# Patient Record
Sex: FEMALE | Race: WHITE | NOT HISPANIC OR LATINO | Employment: FULL TIME | URBAN - NONMETROPOLITAN AREA
[De-identification: names, ages, dates, MRNs, and addresses within clinical notes are randomized per-mention and may not be internally consistent; named-entity substitution may affect disease eponyms.]

---

## 2017-11-03 ENCOUNTER — TRANSCRIBE ORDERS (OUTPATIENT)
Dept: ADMINISTRATIVE | Facility: HOSPITAL | Age: 67
End: 2017-11-03

## 2017-11-03 DIAGNOSIS — R13.10 PROBLEMS WITH SWALLOWING AND MASTICATION: Primary | ICD-10-CM

## 2017-11-06 ENCOUNTER — HOSPITAL ENCOUNTER (OUTPATIENT)
Dept: GENERAL RADIOLOGY | Facility: HOSPITAL | Age: 67
Discharge: HOME | End: 2017-11-06

## 2017-11-06 ENCOUNTER — HOSPITAL ENCOUNTER (OUTPATIENT)
Dept: ULTRASOUND IMAGING | Facility: HOSPITAL | Age: 67
Discharge: HOME | End: 2017-11-06
Admitting: PHYSICIAN ASSISTANT

## 2017-11-06 DIAGNOSIS — R13.10 PROBLEMS WITH SWALLOWING AND MASTICATION: ICD-10-CM

## 2017-11-06 PROCEDURE — G8998 SWALLOW D/C STATUS: HCPCS

## 2017-11-06 PROCEDURE — 76536 US EXAM OF HEAD AND NECK: CPT

## 2017-11-06 PROCEDURE — G8996 SWALLOW CURRENT STATUS: HCPCS

## 2017-11-06 PROCEDURE — 74230 X-RAY XM SWLNG FUNCJ C+: CPT

## 2017-11-06 PROCEDURE — 92611 MOTION FLUOROSCOPY/SWALLOW: CPT

## 2017-11-06 PROCEDURE — G8997 SWALLOW GOAL STATUS: HCPCS

## 2017-11-06 NOTE — THERAPY DISCHARGE NOTE
Outpatient Speech Language Pathology   Adult Swallow Initial Eval/Discharge  [facility]     Patient Name: Anjelica Gee  : 1950  MRN: [id number]  Today's Date: 2017         Visit Date: 2017   There is no problem list on file for this patient.  SPEECH-LANGUAGE PATHOLOGY EVALUTION - VFSS  Subjective: The patient was seen on this date for a VFSS(Videofluoroscopic Swallowing Study).  Patient was alert and cooperative.    The patient's history is significant for pneumonia.   Objective: Risks/benefits were reviewed with the family, and consent was obtained. The study was completed with SLP and Radiologist present. The patient was seen in lateral view(s). Textures given included thin liquid, nectar thick liquid, honey thick liquid, puree consistency, mechanical soft consistency and regular consistency.  Assessment: Difficulties were noted with thin liquid, nectar thick liquid, honey thick liquid, puree consistency, mechanical soft consistency and regular consistency, characterized by premature spillage to the point of the vallecula with regular, mechanical soft, pudding, and honey thick consistencies as a result of decreased back of tongue strength and delayed swallow initiation. ST notes that throughout evaluation multiple swallows were completed with all PO trials. With nectar thick and thin consistencies the pt experienced premature spillage to the point of the pyriform sinuses secondary to decreased back of tongue strength and delayed swallow initiation. The pt was noted to exhibit limited hyolaryngeal excursion and essentially no epiglottic inversion throughout evaluation. With nectar thick liquids the pt experienced flash penetration 1x and 1x deep penetration with questionable aspiration of nectar thick liquids versus residue from previously completed thin liquids. Deep penetration of thin liquids occurred 3x in which the pt was unable to clear residue. A chin tuck was utilized with thin liquids  1x without benefit and the pt actually aspirated thin liquid residue with subsequent swallow. Residue of thin liquids remained within the vestibule and was noted to be aspirated with trials of solid consistencies. Pt's swallow deficits are ultimately the result of decreased back of tongue strength, delayed swallow initiation, decreased hyolaryngeal excursion, and essentially no epiglottic inversion. Moderate residue remained within the vallecula and along the posterior pharyngeal wall with nectar thick and thin liquids. Mild residue was noted throughout the oropharyngeal area with all other consistencies completed. It is important to note the pt did display a delayed cough following instances of aspiration.  Esophageal screen was performed.       Silent Thin Nectar Honey Puree Fork Mashed Mech Soft Regular Liquid Wash   Penetration  x X-deep 3x 1x-flash  1x-deep         Aspiration     x X-?         Residue x x x x x  x x      SLP Findings: Patient presents with moderate oropharyngeal dysphagia.   Recommendations: Diet Textures: honey thick liquid, regular consistency food. Medications should be taken whole with thickened liquids or puree. May have water and Ice between meals after oral care, under staff or family supervision and with the recommended strategies for safe swallowing.  Recommended Strategies: Upright for PO. Oral care before breakfast, after all meals and PRN.   Dysphagia therapy is recommended. Rationale: To improve swallow function.  Conrad Robledo, MS CCC-SLP 11/6/2017 12:08 PM           Past Medical History:   Diagnosis Date   • Depression    • High cholesterol    • Hypertension    • Hypothyroidism         Past Surgical History:   Procedure Laterality Date   • BLADDER SURGERY      sling   • HYSTERECTOMY     • THYROIDECTOMY, PARTIAL     • TONSILLECTOMY           Visit Dx:     ICD-10-CM ICD-9-CM   1. Problems with swallowing and mastication R13.10 V41.6                   Adult Dysphagia - 11/06/17 1000      Adult Dysphagia Background    Patient Description of Complaint Coughing with liquids. Feeling as if food is hanging in throat.  -CS    Date of Onset With the past year  -CS    Symptoms Reported by Patient Coughing;Difficulty swallowing solids;Difficulty swallowing liquids;Difficulty swallowing pills;Food gets stuck  -CS    History Pertinent to Diagnosis History of pneumonia in the past.  -CS    Current Diet (Solids) Regular  -CS    Diet Level (Liquids) Thin Liquid  -CS    Oral Mech    Respiratory/Swallow Coordination Pattern WFL  -CS    Position During Evaluation Upright  -CS    Anatomy/Physiology WNL Patient demonstrates anatomy and physiology that is WNL  -CS    Dentition Patient has dentures  -CS    Oral Health Oral cavity is clean  -CS    Awareness/Control of Secretions Patient swallows saliva  -CS    Foods/Liquids Presented    Method of Administration --  -CS    VFSS Exam    Study Completed By SLP and Radiologist (comment)  -CS    Textures Presented Thin;Nectar;Honey;Pudding;Solid  -CS    Position During Study/Views Obtained Upright;Lateral View  -CS    Physiologic Impairments Noted on VFSS    Physiologic Impairments Noted on VFSS X  -CS    Mistiming x  -CS    Reduced Laryngeal Elevation x  -CS    Reduced Hyolaryngeal Anterior Excursion x  -CS    Reduced Pharyngeal Contraction x  -CS    Reduced Closure Level of Vocal Folds x  -CS    Symptoms    Aspiration X  -CS    Aspiration During With these consistencies (comment);With this reaction (comment)   With thin liquids with a delayed cough  -CS    Residue Noted X  -CS    Valleculae With these consistencies (comment);With this reaction (comment)   With all consistencies  -CS    Pharyngeal Walls With these consistencies (comment);With this reaction (comment)   With thin and nectar thick liquids  -CS    Compensatory Strategies X  -CS    Compensatory Strategies Used Chin tuck with thin liquids.  -CS    Results/Recs/Barriers/Education for Dysphagia    Factors  Affecting Performance no difficulty participating in study  -CS    Clinical Impression: Swallowing Moderate:;oropharyngeal phase dysphagia  -CS    Impact on Function risk for aspiration;risk for pneumonia  -CS    Recommendations for Diet/Nutirition full oral diet  -CS    Swallowing Precautions upright position for at least 30 minutes after meals  -CS    Recommendations dysphagia therapy to address swallowing deficits  -CS      User Key  (r) = Recorded By, (t) = Taken By, (c) = Cosigned By    Initials Name Provider Type    SANDY Robledo MS CCC-SLP Speech and Language Pathologist                            OP SLP Education       11/06/17 1039    Education    Barriers to Learning No barriers identified  -CS    Education Provided Described results of evaluation;Family/caregivers expressed understanding of evaluation  -CS    Assessed Learning needs;Learning motivation;Learning preferences;Learning readiness  -CS    Learning Motivation Strong  -CS    Learning Method Explanation  -CS    Teaching Response Verbalized understanding  -CS    Education Comments Educated of VFSS as well as recommended regular diet with honey thick liquids. Meds to be taken whoe in pudding/applesauce. Ok for water between meals 30 minutes following PO intake.  -CS      User Key  (r) = Recorded By, (t) = Taken By, (c) = Cosigned By    Initials Name Effective Dates    SANDY Robledo MS CCC-SLP 06/28/17 -                     OP SLP Assessment/Plan - 11/06/17 1000     SLP Assessment    Clinical Impression: Swallowing Moderate:;oropharyngeal phase dysphagia  -CS      User Key  (r) = Recorded By, (t) = Taken By, (c) = Cosigned By    Initials Name Provider Type    SANDY Robledo MS CCC-SLP Speech and Language Pathologist              SLP Outcome Measures (last 72 hours)      SLP Outcome Measures       11/06/17 1000          SLP Outcome Measures    Outcome Measure Used? Adult NOMS  -CS      OTHER    SLP Diagnoses Dysphagia  -CS      FCM Scores     FCM Chosen Swallowing  -CS      Swallowing FCM Score 4  -CS        User Key  (r) = Recorded By, (t) = Taken By, (c) = Cosigned By    Initials Name Effective Dates    CS Conrad Robledo MS CCC-SLP 06/28/17 -             Time Calculation:   SLP Start Time: 0945  SLP Stop Time: 1130  SLP Time Calculation (min): 105 min    Therapy Charges for Today     Code Description Service Date Service Provider Modifiers Qty    26186400754 HC ST SWALLOWING CURRENT STATUS 11/6/2017 Conrad Robledo MS CCC-SLP GN, CK 1    64188771124 HC ST SWALLOWING PROJECTED 11/6/2017 Conrad Robledo MS CCC-SLP GN, CK 1    64903805910 HC ST SWALLOWING DISCHARGE 11/6/2017 Conrad Robledo MS CCC-SLP GN, CK 1    [id number] HC ST MOTION FLUORO EVAL SWALLOW 7 11/6/2017 Conrad Robledo MS CCC-SLP GN 1          SLP G-Codes  SLP NOMS Used?: Yes  Functional Limitations: Swallowing  Swallow Current Status (): At least 40 percent but less than 60 percent impaired, limited or restricted  Swallow Goal Status (): At least 40 percent but less than 60 percent impaired, limited or restricted  Swallow Discharge Status (): At least 40 percent but less than 60 percent impaired, limited or restricted           Conrad Robledo MS CCC-SLP  11/6/2017

## 2017-12-04 ENCOUNTER — OFFICE VISIT (OUTPATIENT)
Dept: OTOLARYNGOLOGY | Facility: CLINIC | Age: 67
End: 2017-12-04

## 2017-12-04 VITALS
TEMPERATURE: 97.1 F | DIASTOLIC BLOOD PRESSURE: 65 MMHG | RESPIRATION RATE: 16 BRPM | WEIGHT: 215 LBS | BODY MASS INDEX: 30.78 KG/M2 | SYSTOLIC BLOOD PRESSURE: 134 MMHG | HEIGHT: 70 IN | HEART RATE: 56 BPM

## 2017-12-04 DIAGNOSIS — R13.14 PHARYNGOESOPHAGEAL DYSPHAGIA: Primary | ICD-10-CM

## 2017-12-04 DIAGNOSIS — J31.0 OTHER CHRONIC RHINITIS: ICD-10-CM

## 2017-12-04 DIAGNOSIS — K21.9 LPRD (LARYNGOPHARYNGEAL REFLUX DISEASE): ICD-10-CM

## 2017-12-04 DIAGNOSIS — E04.9 GOITER: ICD-10-CM

## 2017-12-04 DIAGNOSIS — J37.0 CHRONIC LARYNGITIS: ICD-10-CM

## 2017-12-04 DIAGNOSIS — E89.0 POSTOPERATIVE HYPOTHYROIDISM: ICD-10-CM

## 2017-12-04 PROCEDURE — 99204 OFFICE O/P NEW MOD 45 MIN: CPT | Performed by: PHYSICIAN ASSISTANT

## 2017-12-04 PROCEDURE — 31575 DIAGNOSTIC LARYNGOSCOPY: CPT | Performed by: PHYSICIAN ASSISTANT

## 2017-12-04 RX ORDER — ACETAMINOPHEN 325 MG/1
650 TABLET ORAL
COMMUNITY

## 2017-12-04 RX ORDER — MULTIVIT WITH MINERALS/LUTEIN
250 TABLET ORAL
COMMUNITY
End: 2022-10-11

## 2017-12-04 RX ORDER — CETIRIZINE HYDROCHLORIDE 10 MG/1
10 TABLET ORAL
COMMUNITY
End: 2022-10-11

## 2017-12-04 RX ORDER — FLUOXETINE HYDROCHLORIDE 20 MG/1
20 CAPSULE ORAL
COMMUNITY
End: 2019-06-11 | Stop reason: HOSPADM

## 2017-12-04 RX ORDER — LEVOTHYROXINE SODIUM 88 UG/1
TABLET ORAL
COMMUNITY

## 2017-12-04 RX ORDER — MULTIVIT-MIN/IRON/FOLIC ACID/K 18-600-40
CAPSULE ORAL
COMMUNITY
End: 2022-10-11

## 2017-12-04 NOTE — PATIENT INSTRUCTIONS
Recommendation was made to consider a Paleo Diet.      It Starts with Food - Carlos and Adrianna Mortensen was recommended.  The basics of a Paleo diet was covered including a diet composed of meat, fruits and non-leguminous vegetables.  It is important to avoid all processed foods.  This requires that you not eat ANYTHING with a chemical preservative.    The Paleo lifestyle is about nourishing our bodies with real food that is grown and raised as nature intended, not manufactured in a facility.  It is about unplugging from the modern day electronics from time to time and giving your body a chance to actually rest.    It is important to stick very close to this diet for 6 weeks without significant cheating.  It allows you to experience the benefit of eating this way, giving your body time to detoxify.    Acceptable foods  Fresh Fish/seafood  Fresh meats-preferably grass-fed and free range  Fresh fruits  Fresh vegetables  Healthy fats-Coconut oil, olive oil, avocados etc.  Nuts/seeds    Foods to avoid  Grains  Legumes  Processed foods  Soy  Refined sugar    Web sites include:  www.[employer].PrimalApicad.Cytodyn  www.EverydayPaleo.com  www.Aviir  Www.Victor.Cytodyn    Other Karen Resources:  NomNojorgito Paleo  PrimalPaleo  Paleo Alligator  Nourished      Other Recommended Books:  The Paleo Solution  Wheat Belly  Grain Brain  Primal Body/Primal Mind    ALL ABOUT HEARTBURN AND THE LIFESTYLE CHANGES THAT HELP    Lifestyle Changes Can Help Relieve The Burning Pain In Your Tummy    What is Heartburn?  Heartburn occurs when the lining of the esophagus (the tube that connects the mouth to the stomach) is exposed to and irritated by stomach acid.  Heartburn often feels like a burning pain in the middle of your chest that moves up your throat.  You may also have the sensation that food has come back into your mouth, leaving a sour or bitter taste.  Almost everyone has heartburn once in a while.  But if you have heartburn 2 or  more times per week, it can be a sign of a more serious problem call gastroesophogeal reflux disease, or GERD.    What causes Heartburn?  Heartburn usually occurs when the valve at the segundo of the stomach (the lower esophageal sphincter or LES) doesn’t close the way it should.  If the LES is weak or opens at the wrong time, stomach acid can reflux (flow back) into the esophagus and cause heartburn.  Factors that can affect the LES include:    • Eating or drinking certain foods and beverages such as chocolate, peppermint, fried or fatty foods, coffee, or drinks that contain alcohol  • Having a hiatal hernia - a common physical condition where part of the stomach protrudes up through the diaphragm  • Lying down  • Smoking cigarettes  • Taking certain medications (be sure to tell your doctor about all medications or supplements you take)    What foods can make heartburn worse?  All foods cause the stomach to produce acid, although foods can affect people in different ways.  Following is a list of foods and beverages that may aggravate your symptoms.  You may want to eat them less often.    • Fried or fatty foods  • Heavy seasoning and spicy foods  • Coffee  • Onions  • Orange juice, grapefruit juice, and tomato juice  • Alcoholic beverages  • Chocolate  • Peppermint and spearmint    What else can I do to help control my heartburn?  Try these lifestyle changes:  • Stop Smoking  • Lose weight - if you’re overweight  • Exercise to help control your weight (talk to your doctor first before starting any exercise program).  • Eat small, well-balanced meals  • Reduce abdominal pressure-don’t wear tight belts or tight clothing  • Avoid eating within 2 hours before bedtime  • Elevate your bed so the head is 6 to 8 inches higher than the foot.  This can help reduce acid reflux at night  • Let your doctor know about all the drugs and supplements you are taking.  Some of them may contribute to your heartburn.    What if these  things don’t work?  Talk to your doctor, who can discuss many treatments with you.  Although there are several possible causes of heartburn associated with GERD, they all involve stomach acids.  So no matter what the cause may be, the medicines to reduce stomach acid can prevent heartburn.          MyPlate from [employer]  The general, healthful diet is based on the 2010 Dietary Guidelines for Americans. The amount of food you need to eat from each food group depends on your age, sex, and level of physical activity and can be individualized by a dietitian. Go to ChooseMyPlate.gov for more information.  WHAT DO I NEED TO KNOW ABOUT THE MYPLATE PLAN?  · Enjoy your food, but eat less.    · Avoid oversized portions.      ½ of your plate should include fruits and vegetables.    ¼ of your plate should be grains.    ¼ of your plate should be protein.  Grains  · Make at least half of your grains whole grains.  · For a 2,000 calorie daily food plan, eat 6 oz every day.  · 1 oz is about 1 slice bread, 1 cup cereal, or ½ cup cooked rice, cereal, or pasta.  Vegetables  · Make half your plate fruits and vegetables.  · For a 2,000 calorie daily food plan, eat 2½ cups every day.  · 1 cup is about 1 cup raw or cooked vegetables or vegetable juice or 2 cups raw leafy greens.  Fruits  · Make half your plate fruits and vegetables.  · For a 2,000 calorie daily food plan, eat 2 cups every day.  · 1 cup is about 1 cup fruit or 100% fruit juice or ½ cup dried fruit.  Protein  · For a 2,000 calorie daily food plan, eat 5½ oz every day.  · 1 oz is about 1 oz meat, poultry, or fish, ¼ cup cooked beans, 1 egg, 1 Tbsp peanut butter, or ½ oz nuts or seeds.  Dairy  · Switch to fat-free or low-fat (1%) milk.  · For a 2,000 calorie daily food plan, eat 3 cups every day.  · 1 cup is about 1 cup milk or yogurt or soy milk (soy beverage), 1½ oz natural cheese, or 2 oz processed cheese.  Fats, Oils, and Empty Calories  · Only small amounts of oils are  recommended.  · Empty calories are calories from solid fats or added sugars.  · Compare sodium in foods like soup, bread, and frozen meals. Choose the foods with lower numbers.  · Drink water instead of sugary drinks.  WHAT FOODS CAN I EAT?  Grains  Whole grains such as whole wheat, quinoa, millet, and bulgur. Bread, rolls, and pasta made from whole grains. Brown or wild rice. Hot or cold cereals made from whole grains and without added sugar.  Vegetables  All fresh vegetables, especially fresh red, dark green, or orange vegetables. Peas and beans. Low-sodium frozen or canned vegetables prepared without added salt. Low-sodium vegetable juices.  Fruits  All fresh, frozen, and dried fruits. Canned fruit packed in water or fruit juice without added sugar. Fruit juices without added sugar.  Meats and Other Protein Sources  Boiled, baked, or grilled lean meat trimmed of fat. Skinless poultry. Fresh seafood and shellfish. Canned seafood packed in water. Unsalted nuts and unsalted nut butters. Tofu. Dried beans and pea. Eggs.  Dairy  Low-fat or fat-free milk, yogurt, and cheeses.   Sweets and Desserts  Frozen desserts made from low-fat milk.  Fats and Oils  Olive, peanut, and canola oils and margarine. Salad dressing and mayonnaise made from these oils.  Other  Soups and casseroles made from allowed ingredients and without added fat or salt.  The items listed above may not be a complete list of recommended foods or beverages. Contact your dietitian for more options.   WHAT FOODS ARE NOT RECOMMENDED?  Grains  Sweetened, low-fiber cereals. Packaged baked goods. Snack crackers and chips. Cheese crackers, butter crackers, and biscuits. Frozen waffles, sweet breads, doughnuts, pastries, packaged baking mixes, pancakes, cakes, and cookies.  Vegetables  Regular canned or frozen vegetables or vegetables prepared with salt. Canned tomatoes. Canned tomato sauce. Fried vegetables. Vegetables in cream sauce or cheese  sauce.  Fruits  Fruits packed in syrup or made with added sugar.   Meats and Other Protein Sources  Marbled or fatty meats such as ribs. Poultry with skin. Fried meats, poultry, eggs, or fish. Sausages, hot dogs, and deli meats such as pastrami, bologna, or salami.  Dairy  Whole milk, cream, cheeses made from whole milk, sour cream. Ice cream or yogurt made from whole milk or with added sugar.  Beverages  For adults, no more than one alcoholic drink per day. Regular soft drinks or other sugary beverages. Juice drinks.  Sweets and Desserts  Sugary or fatty desserts, candy, and other sweets.  Fats and Oils  Solid shortening or partially hydrogenated oils. Solid margarine. Margarine that contains trans fats. Butter.  The items listed above may not be a complete list of foods and beverages to avoid. Contact your dietitian for more information.     This information is not intended to replace advice given to you by your health care provider. Make sure you discuss any questions you have with your health care provider.     Document Released: 01/06/2009 Document Revised: 01/08/2016 Document Reviewed: 11/26/2014  [employer] Interactive Patient Education ©2017 [employer]

## 2017-12-04 NOTE — PROGRESS NOTES
YOB: 1950  Location: [facility]  Location Address: [address] [place] [address], [facility], [address] [place] [address]  Location Phone: [phone]    Chief Complaint   Patient presents with   • Difficulty Swallowing       History of Present Illness  Anjelica Gee is a 67 y.o. female.  Anjelica Gee is here for evaluation of ENT complaints. The patient has had problems with dysphagia and a globus sensation  The symptoms are localized to the right side. The patient has had mild to moderate symptoms. The symptoms have been present for the last several months The symptoms are aggravated by  no identifiable factors. The symptoms are improved by no identifiable factors.     Study Result   US THYROID- 2017 8:27 AM CST      REASON FOR EXAM: R13.10; R13.10-Dysphagia, unspecified        COMPARISON: NONE.       TECHNIQUE: Multiple longitudinal and transverse real-time sonographic  images of the thyroid are obtained.       FINDINGS:   The LEFT thyroid lobe has been removed. The RIGHT thyroid lobe is  heterogeneous and measures 6.1 x 2.7 x 3.3 cm. Vascularity is increased  area no discrete nodule is identified. There is thickening of the  isthmus to 6 mm.      IMPRESSION:  1. Heterogeneous and enlarged RIGHT thyroid without discrete nodule.  Findings are likely due to thyroiditis.  2. Post LEFT thyroidectomy.      This report was finalized on 2017 09:27 by Dr. Ruddy Nguyễn MD.     Study Result   FL VIDEO SWALLOW W SPEECH- 2017 8:57 AM CST      REASON FOR EXAM: R13.10; R13.10-Dysphagia, unspecified      COMPARISON: NONE       FLUOROSCOPY TIME: 1.2 minutes      NUMBER OF IMAGES: None      TECHNIQUE: In conjunction with speech pathology services, video  fluoroscopic swallow examination was performed with oral ingestion of  barium containing substances of various viscosities.       FINDINGS: Medium bolus sizes are well controlled with no spillage into  the oropharynx. No pooling is demonstrated. There is soft  palate  elevation and coverage of the posterior nasopharynx with swallowing. No  stasis is noted within the valleculae or piriform sinuses. Deep  penetration is noted with nectar and thin consistencies.      IMPRESSION:  1. Normal swallowing mechanism.   2. Deep penetration without evidence of aspiration.       Please see speech pathologist's report for further details and  recommendations.           This report was finalized on 11/06/2017 09:17 by Dr. Ruddy Nguyễn MD       Past Medical History:   Diagnosis Date   • Depression    • High cholesterol    • Hypertension    • Hypothyroidism        Past Surgical History:   Procedure Laterality Date   • BLADDER SURGERY      sling   • HYSTERECTOMY     • THYROIDECTOMY, PARTIAL     • THYROIDECTOMY, PARTIAL Left    • TONSILLECTOMY     • TONSILLECTOMY           Current Outpatient Prescriptions:   •  acetaminophen (TYLENOL) 325 MG tablet, Take 650 mg by mouth., Disp: , Rfl:   •  carvedilol (COREG) 6.25 MG tablet, Take 6.25 mg by mouth 2 times daily (with meals).  , Disp: , Rfl:   •  cetirizine (zyrTEC) 10 MG tablet, Take 10 mg by mouth., Disp: , Rfl:   •  Cholecalciferol (VITAMIN D) 2000 units capsule, Take  by mouth., Disp: , Rfl:   •  FLUoxetine (PROzac) 20 MG capsule, Take 20 mg by mouth., Disp: , Rfl:   •  furosemide (LASIX) 20 MG tablet, , Disp: , Rfl:   •  levothyroxine (SYNTHROID, LEVOTHROID) 88 MCG tablet, Take  by mouth., Disp: , Rfl:   •  losartan (COZAAR) 100 MG tablet, , Disp: , Rfl:   •  lovastatin (MEVACOR) 20 MG tablet, , Disp: , Rfl: 5  •  meloxicam (MOBIC) 15 MG tablet, , Disp: , Rfl: 5  •  mupirocin (BACTROBAN) 2 % ointment, Apply  topically 3 (Three) Times a Day. intranasal, Disp: 22 g, Rfl: 0  •  pantoprazole (PROTONIX) 40 MG EC tablet, , Disp: , Rfl:   •  PARoxetine (PAXIL) 40 MG tablet, Take 40 mg by mouth every morning.  , Disp: , Rfl:   •  vitamin C (ASCORBIC ACID) 250 MG tablet, Take 250 mg by mouth., Disp: , Rfl:     Ace inhibitors; Amlodipine  besy-benazepril hcl; Aspirin; B12 folate  [folic acid-vit b6-vit b12]; Codeine; Demerol [meperidine]; Detrol  [tolterodine tartrate]; Latex; Morphine and related; Penicillins; and Sulfa antibiotics    Family History   Problem Relation Age of Onset   • No Known Problems Father    • No Known Problems Mother    • Kidney cancer Sister    • Prostate cancer Brother        Social History     Social History   • Marital status:      Spouse name: N/A   • Number of children: N/A   • Years of education: N/A     Occupational History   • Not on file.     Social History Main Topics   • Smoking status: Never Smoker   • Smokeless tobacco: Never Used   • Alcohol use No   • Drug use: No   • Sexual activity: Not on file     Other Topics Concern   • Not on file     Social History Narrative       Review of Systems   Constitutional: Negative for activity change, appetite change, chills, diaphoresis, fatigue, fever and unexpected weight change.   HENT: Positive for trouble swallowing and voice change. Negative for congestion, dental problem, drooling, ear discharge, ear pain, facial swelling, hearing loss, mouth sores, nosebleeds, postnasal drip, rhinorrhea, sinus pressure, sneezing, sore throat and tinnitus.         Enlarged thyroid   Eyes: Negative.    Respiratory: Negative.    Cardiovascular: Negative.    Gastrointestinal: Negative.    Endocrine: Negative.    Skin: Negative.    Allergic/Immunologic: Negative for environmental allergies, food allergies and immunocompromised state.   Neurological: Negative.    Hematological: Negative.    Psychiatric/Behavioral: Negative.        Vitals:    12/04/17 0958   BP: 134/65   Pulse: 56   Resp: 16   Temp: 97.1 °F (36.2 °C)       Objective     Physical Exam  CONSTITUTIONAL: well nourished, alert, oriented, in no acute distress     COMMUNICATION AND VOICE: able to communicate normally, normal voice quality    HEAD: normocephalic, no lesions, atraumatic, no tenderness, no masses     FACE:  appearance normal, no lesions, no tenderness, no deformities, facial motion symmetric    SALIVARY GLANDS: parotid glands with no tenderness, no swelling, no masses, submandibular glands with normal size, nontender    EYES: ocular motility normal, eyelids normal, orbits normal, no proptosis, conjunctiva normal , pupils equal, round     EARS:  Hearing: response to conversational voice normal bilaterally   External Ears: auricles without lesions  Otoscopic: tympanic membrane appearance normal, no lesions, no perforation, normal mobility, no fluid    NOSE:  External Nose: structure normal, no tenderness on palpation, no nasal discharge, no lesions, no evidence of trauma, nostrils patent   Intranasal Exam: nasal mucosa edema and erythema with crusting and drainage, vestibule within normal limits, inferior turbinate enlarged, nasal septum midline   Nasopharynx:     ORAL:  Lips: upper and lower lips without lesion   Teeth: dentition within normal limits for age   Gums: gingivae healthy   Oral Mucosa: oral mucosa normal, no mucosal lesions   Floor of Mouth: Warthin’s duct patent, mucosa normal  Tongue: lingual mucosa normal without lesions, normal tongue mobility   Palate: soft and hard palates with normal mucosa and structure  Oropharynx: oropharyngeal mucosa erythema    NECK: neck appearance normal, no mass,  noted without erythema or tenderness    THYROID: right thyromegaly, left surgically absent, no tenderness, nodules or mass present on palpation, position midline     LYMPH NODES: no lymphadenopathy    CHEST/RESPIRATORY: respiratory effort normal, normal breath sounds     CARDIOVASCULAR: rate and rhythm normal, extremities without cyanosis or edema      NEUROLOGIC/PSYCHIATRIC: oriented to time, place and person, mood normal, affect appropriate, CN II-XII intact grossly    Procedure Note    Anesthesia: topical 4% tetracaine and oxymetazoline mix    Endoscopy Type: Flexible Laryngoscopy    Indications for Procedure:  Hoarseness, dysphagia or aspiration - not able to be clearly evaluated by indirect laryngoscopy    Procedure Details:    The patient was placed in the sitting position.  After topical anesthesia and decongestion, the 4 mm laryngoscope was passed.  The nasal cavities, nasopharynx, oropharynx, hypopharynx, and larynx were all examined.  Vocal cords were examined during respiration and phonation.  The following findings were noted:    Findings:   Mucosal Surfaces:  The mucosal surfaces demonstrated inflammation, thickened mucus, and edema.   Nasopharynx:  The nasopharynx was found to have no lesion or mass.   Base of Tongue:  The base of tongue was found to have no mass or lesion.   Epiglottis:  The epiglottis was found to have mild edema.   Aryepiglottic Folds:  The AE folds were found to have no mass or lesion.   False Vocal Cords:  The false vocal cords were found to have no mass or lesion.   True Vocal Cords:  The true vocal cords were found to have no mass or lesion.   Arytenoids:  The arytenoids were found to have findings of intra- arytenoid pachydermic change and erythema.   Hypopharynx:  The hypopharynx was found to have no mass or lesion.     Condition:  Stable.  Patient tolerated procedure well.    Complications:  None      Assessment/Plan   Problems Addressed this Visit        Respiratory    LPRD (laryngopharyngeal reflux disease)    Chronic laryngitis    Chronic rhinitis    Relevant Medications    cetirizine (zyrTEC) 10 MG tablet    mupirocin (BACTROBAN) 2 % ointment       Digestive    Pharyngoesophageal dysphagia - Primary       Endocrine    Postoperative hypothyroidism    Relevant Medications    levothyroxine (SYNTHROID, LEVOTHROID) 88 MCG tablet    Goiter    Relevant Medications    levothyroxine (SYNTHROID, LEVOTHROID) 88 MCG tablet        * Surgery not found *  No orders of the defined types were placed in this encounter.    Return in about 4 months (around 4/4/2018) for Recheck swallowing.        Patient Instructions   Recommendation was made to consider a Paleo Diet.      It Starts with Food - Carlos and Adrianna Mortensen was recommended.  The basics of a Paleo diet was covered including a diet composed of meat, fruits and non-leguminous vegetables.  It is important to avoid all processed foods.  This requires that you not eat ANYTHING with a chemical preservative.    The Paleo lifestyle is about nourishing our bodies with real food that is grown and raised as nature intended, not manufactured in a facility.  It is about unplugging from the modern day electronics from time to time and giving your body a chance to actually rest.    It is important to stick very close to this diet for 6 weeks without significant cheating.  It allows you to experience the benefit of eating this way, giving your body time to detoxify.    Acceptable foods  Fresh Fish/seafood  Fresh meats-preferably grass-fed and free range  Fresh fruits  Fresh vegetables  Healthy fats-Coconut oil, olive oil, avocados etc.  Nuts/seeds    Foods to avoid  Grains  Legumes  Processed foods  Soy  Refined sugar    Web sites include:  www.PrimalBodyREQQIPrimalMind.BOLD Guidance  www.EverydayPaleo.com  www.[employer]  Www.[employer]    Other Karen Resources:  NomNojorgito Paleo  PrimalPaleo  Paleo Lenorah  Nourished      Other Recommended Books:  The Paleo Solution  Wheat Belly  Grain Brain  Primal Body/Primal Mind    ALL ABOUT HEARTBURN AND THE LIFESTYLE CHANGES THAT HELP    Lifestyle Changes Can Help Relieve The Burning Pain In Your Tummy    What is Heartburn?  Heartburn occurs when the lining of the esophagus (the tube that connects the mouth to the stomach) is exposed to and irritated by stomach acid.  Heartburn often feels like a burning pain in the middle of your chest that moves up your throat.  You may also have the sensation that food has come back into your mouth, leaving a sour or bitter taste.  Almost everyone has heartburn once in a while.  But  if you have heartburn 2 or more times per week, it can be a sign of a more serious problem call gastroesophogeal reflux disease, or GERD.    What causes Heartburn?  Heartburn usually occurs when the valve at the segundo of the stomach (the lower esophageal sphincter or LES) doesn’t close the way it should.  If the LES is weak or opens at the wrong time, stomach acid can reflux (flow back) into the esophagus and cause heartburn.  Factors that can affect the LES include:    • Eating or drinking certain foods and beverages such as chocolate, peppermint, fried or fatty foods, coffee, or drinks that contain alcohol  • Having a hiatal hernia - a common physical condition where part of the stomach protrudes up through the diaphragm  • Lying down  • Smoking cigarettes  • Taking certain medications (be sure to tell your doctor about all medications or supplements you take)    What foods can make heartburn worse?  All foods cause the stomach to produce acid, although foods can affect people in different ways.  Following is a list of foods and beverages that may aggravate your symptoms.  You may want to eat them less often.    • Fried or fatty foods  • Heavy seasoning and spicy foods  • Coffee  • Onions  • Orange juice, grapefruit juice, and tomato juice  • Alcoholic beverages  • Chocolate  • Peppermint and spearmint    What else can I do to help control my heartburn?  Try these lifestyle changes:  • Stop Smoking  • Lose weight - if you’re overweight  • Exercise to help control your weight (talk to your doctor first before starting any exercise program).  • Eat small, well-balanced meals  • Reduce abdominal pressure-don’t wear tight belts or tight clothing  • Avoid eating within 2 hours before bedtime  • Elevate your bed so the head is 6 to 8 inches higher than the foot.  This can help reduce acid reflux at night  • Let your doctor know about all the drugs and supplements you are taking.  Some of them may contribute to your  heartburn.    What if these things don’t work?  Talk to your doctor, who can discuss many treatments with you.  Although there are several possible causes of heartburn associated with GERD, they all involve stomach acids.  So no matter what the cause may be, the medicines to reduce stomach acid can prevent heartburn.          MyPlate from [employer]  The general, healthful diet is based on the 2010 Dietary Guidelines for Americans. The amount of food you need to eat from each food group depends on your age, sex, and level of physical activity and can be individualized by a dietitian. Go to ChooseMyPlate.gov for more information.  WHAT DO I NEED TO KNOW ABOUT THE MYPLATE PLAN?  · Enjoy your food, but eat less.    · Avoid oversized portions.      ½ of your plate should include fruits and vegetables.    ¼ of your plate should be grains.    ¼ of your plate should be protein.  Grains  · Make at least half of your grains whole grains.  · For a 2,000 calorie daily food plan, eat 6 oz every day.  · 1 oz is about 1 slice bread, 1 cup cereal, or ½ cup cooked rice, cereal, or pasta.  Vegetables  · Make half your plate fruits and vegetables.  · For a 2,000 calorie daily food plan, eat 2½ cups every day.  · 1 cup is about 1 cup raw or cooked vegetables or vegetable juice or 2 cups raw leafy greens.  Fruits  · Make half your plate fruits and vegetables.  · For a 2,000 calorie daily food plan, eat 2 cups every day.  · 1 cup is about 1 cup fruit or 100% fruit juice or ½ cup dried fruit.  Protein  · For a 2,000 calorie daily food plan, eat 5½ oz every day.  · 1 oz is about 1 oz meat, poultry, or fish, ¼ cup cooked beans, 1 egg, 1 Tbsp peanut butter, or ½ oz nuts or seeds.  Dairy  · Switch to fat-free or low-fat (1%) milk.  · For a 2,000 calorie daily food plan, eat 3 cups every day.  · 1 cup is about 1 cup milk or yogurt or soy milk (soy beverage), 1½ oz natural cheese, or 2 oz processed cheese.  Fats, Oils, and Empty Calories  · Only  small amounts of oils are recommended.  · Empty calories are calories from solid fats or added sugars.  · Compare sodium in foods like soup, bread, and frozen meals. Choose the foods with lower numbers.  · Drink water instead of sugary drinks.  WHAT FOODS CAN I EAT?  Grains  Whole grains such as whole wheat, quinoa, millet, and bulgur. Bread, rolls, and pasta made from whole grains. Brown or wild rice. Hot or cold cereals made from whole grains and without added sugar.  Vegetables  All fresh vegetables, especially fresh red, dark green, or orange vegetables. Peas and beans. Low-sodium frozen or canned vegetables prepared without added salt. Low-sodium vegetable juices.  Fruits  All fresh, frozen, and dried fruits. Canned fruit packed in water or fruit juice without added sugar. Fruit juices without added sugar.  Meats and Other Protein Sources  Boiled, baked, or grilled lean meat trimmed of fat. Skinless poultry. Fresh seafood and shellfish. Canned seafood packed in water. Unsalted nuts and unsalted nut butters. Tofu. Dried beans and pea. Eggs.  Dairy  Low-fat or fat-free milk, yogurt, and cheeses.   Sweets and Desserts  Frozen desserts made from low-fat milk.  Fats and Oils  Olive, peanut, and canola oils and margarine. Salad dressing and mayonnaise made from these oils.  Other  Soups and casseroles made from allowed ingredients and without added fat or salt.  The items listed above may not be a complete list of recommended foods or beverages. Contact your dietitian for more options.   WHAT FOODS ARE NOT RECOMMENDED?  Grains  Sweetened, low-fiber cereals. Packaged baked goods. Snack crackers and chips. Cheese crackers, butter crackers, and biscuits. Frozen waffles, sweet breads, doughnuts, pastries, packaged baking mixes, pancakes, cakes, and cookies.  Vegetables  Regular canned or frozen vegetables or vegetables prepared with salt. Canned tomatoes. Canned tomato sauce. Fried vegetables. Vegetables in cream sauce  or cheese sauce.  Fruits  Fruits packed in syrup or made with added sugar.   Meats and Other Protein Sources  Marbled or fatty meats such as ribs. Poultry with skin. Fried meats, poultry, eggs, or fish. Sausages, hot dogs, and deli meats such as pastrami, bologna, or salami.  Dairy  Whole milk, cream, cheeses made from whole milk, sour cream. Ice cream or yogurt made from whole milk or with added sugar.  Beverages  For adults, no more than one alcoholic drink per day. Regular soft drinks or other sugary beverages. Juice drinks.  Sweets and Desserts  Sugary or fatty desserts, candy, and other sweets.  Fats and Oils  Solid shortening or partially hydrogenated oils. Solid margarine. Margarine that contains trans fats. Butter.  The items listed above may not be a complete list of foods and beverages to avoid. Contact your dietitian for more information.     This information is not intended to replace advice given to you by your health care provider. Make sure you discuss any questions you have with your health care provider.     Document Released: 01/06/2009 Document Revised: 01/08/2016 Document Reviewed: 11/26/2014  [employer] Interactive Patient Education ©2017 [employer]

## 2018-09-18 NOTE — PROGRESS NOTES
Ms. Gee is 68 y.o. female    CHIEF COMPLAINT: I am here for follow up on atrophic vaginitis.    HPI  This is a patient with atrophic vaginitis the vaginal epithelium.  This is been an issue for her for at least 10-15 years.  This was complicated by a protrusion of a TVT sling.  She underwent flap mobilization and coverage followed with Premarin therapy.  She is done well without vaginal discharge.  She has no incontinence.  She has experienced some pelvic discomfort.      The following portions of the patient's history were reviewed and updated as appropriate: allergies, current medications, past family history, past medical history, past social history, past surgical history and problem list.      Review of Systems   Constitutional: Negative for chills and fever.   Gastrointestinal: Negative for abdominal pain, anal bleeding and blood in stool.   Genitourinary: Positive for pelvic pain. Negative for dysuria and hematuria.   All other systems reviewed and are negative.          Current Outpatient Prescriptions:   •  acetaminophen (TYLENOL) 325 MG tablet, Take 650 mg by mouth., Disp: , Rfl:   •  carvedilol (COREG) 6.25 MG tablet, Take 6.25 mg by mouth 2 times daily (with meals).  , Disp: , Rfl:   •  cetirizine (zyrTEC) 10 MG tablet, Take 10 mg by mouth., Disp: , Rfl:   •  Cholecalciferol (VITAMIN D) 2000 units capsule, Take  by mouth., Disp: , Rfl:   •  FLUoxetine (PROzac) 20 MG capsule, Take 20 mg by mouth., Disp: , Rfl:   •  furosemide (LASIX) 20 MG tablet, , Disp: , Rfl:   •  levothyroxine (SYNTHROID, LEVOTHROID) 88 MCG tablet, Take  by mouth., Disp: , Rfl:   •  losartan (COZAAR) 100 MG tablet, , Disp: , Rfl:   •  lovastatin (MEVACOR) 20 MG tablet, , Disp: , Rfl: 5  •  meloxicam (MOBIC) 15 MG tablet, , Disp: , Rfl: 5  •  mupirocin (BACTROBAN) 2 % ointment, Apply  topically 3 (Three) Times a Day. intranasal, Disp: 22 g, Rfl: 0  •  pantoprazole (PROTONIX) 40 MG EC tablet, , Disp: , Rfl:   •  PARoxetine (PAXIL)  "40 MG tablet, Take 40 mg by mouth every morning.  , Disp: , Rfl:   •  vitamin C (ASCORBIC ACID) 250 MG tablet, Take 250 mg by mouth., Disp: , Rfl:   •  [START ON 9/27/2018] conjugated estrogens (PREMARIN) 0.625 MG/GM vaginal cream, Insert  into the vagina 2 (Two) Times a Week for 30 days. Use 0.5gms intravaginally as directed twice weekly, Disp: 42.5 g, Rfl: 5    Past Medical History:   Diagnosis Date   • Chronic laryngitis 12/4/2017   • Chronic rhinitis 12/4/2017   • Depression    • Goiter 12/4/2017   • High cholesterol    • Hypertension    • Hypothyroidism    • LPRD (laryngopharyngeal reflux disease) 12/4/2017   • Postoperative hypothyroidism 12/4/2017       Past Surgical History:   Procedure Laterality Date   • BLADDER SURGERY      sling   • HYSTERECTOMY     • THYROIDECTOMY, PARTIAL     • THYROIDECTOMY, PARTIAL Left    • TONSILLECTOMY     • TONSILLECTOMY         Social History     Social History   • Marital status:      Social History Main Topics   • Smoking status: Never Smoker   • Smokeless tobacco: Never Used   • Alcohol use No   • Drug use: No     Other Topics Concern   • Not on file       Family History   Problem Relation Age of Onset   • No Known Problems Father    • No Known Problems Mother    • Kidney cancer Sister    • Prostate cancer Brother          /76   Temp 98.9 °F (37.2 °C)   Ht 172.7 cm (68\")   Wt 98 kg (216 lb)   BMI 32.84 kg/m²       Physical Exam  Constitutional: Well nourished, Well developed; No apparent distress, her vital signs are reviewed  Psychiatric: Appropriate affect; Alert and oriented  Eyes: Unremarkable  Musculoskeletal: Normal gait and station  GI: Abdomen is soft, non-tender  Respiratory: No distress; Unlabored movement; No accessory musculature needed with symmetric movements  Skin: No pallor or diaphoresis  : Labia normal; Urethral meatus normal position, not stenotic; No significant bladder prolapse.  Pale, smooth, shiny vaginal epithelium with significant " decrease in rugae. Diminished elasticity and turgor of skin, Sparse pubic hair and Retracted urethra without obvious stenosis noted.  A cystocele is not apparent.         Data  Results for orders placed or performed in visit on 09/26/18   POC Urinalysis Dipstick, Multipro   Result Value Ref Range    Color Yellow Yellow, Straw, Dark Yellow, Kelsi    Clarity, UA Clear Clear    Glucose, UA Negative Negative, 1000 mg/dL (3+) mg/dL    Bilirubin Negative Negative    Ketones, UA Negative Negative    Specific Gravity  1.025 1.005 - 1.030    Blood, UA Negative Negative    pH, Urine 6.0 5.0 - 8.0    Protein, POC Negative Negative mg/dL    Urobilinogen, UA Normal Normal    Nitrite, UA Negative Negative    Leukocytes Negative Negative       Assessment and Plan  Anjelica was seen today for follow-up.    Diagnoses and all orders for this visit:    Atrophic vaginitis  -     POC Urinalysis Dipstick, Multipro  -     conjugated estrogens (PREMARIN) 0.625 MG/GM vaginal cream; Insert  into the vagina 2 (Two) Times a Week for 30 days. Use 0.5gms intravaginally as directed twice weekly      -Continue vaginal estrogen replacement.     (Please note that portions of this note were completed with a voice recognition program.)  Kit Sarkar MD  09/26/18  11:57 AM      Cc: Reynaldo Garcia MD

## 2018-09-26 ENCOUNTER — OFFICE VISIT (OUTPATIENT)
Dept: UROLOGY | Facility: CLINIC | Age: 68
End: 2018-09-26

## 2018-09-26 VITALS
HEIGHT: 68 IN | BODY MASS INDEX: 32.74 KG/M2 | TEMPERATURE: 98.9 F | DIASTOLIC BLOOD PRESSURE: 76 MMHG | WEIGHT: 216 LBS | SYSTOLIC BLOOD PRESSURE: 132 MMHG

## 2018-09-26 DIAGNOSIS — N95.2 ATROPHIC VAGINITIS: Primary | ICD-10-CM

## 2018-09-26 LAB
BILIRUB BLD-MCNC: NEGATIVE MG/DL
CLARITY, POC: CLEAR
COLOR UR: YELLOW
GLUCOSE UR STRIP-MCNC: NEGATIVE MG/DL
KETONES UR QL: NEGATIVE
LEUKOCYTE EST, POC: NEGATIVE
NITRITE UR-MCNC: NEGATIVE MG/ML
PH UR: 6 [PH] (ref 5–8)
PROT UR STRIP-MCNC: NEGATIVE MG/DL
RBC # UR STRIP: NEGATIVE /UL
SP GR UR: 1.02 (ref 1–1.03)
UROBILINOGEN UR QL: NORMAL

## 2018-09-26 PROCEDURE — 81001 URINALYSIS AUTO W/SCOPE: CPT | Performed by: UROLOGY

## 2018-09-26 PROCEDURE — 99212 OFFICE O/P EST SF 10 MIN: CPT | Performed by: UROLOGY

## 2019-01-16 LAB
ALBUMIN SERPL-MCNC: 4.3 G/DL (ref 3.5–5.2)
ALP BLD-CCNC: 134 U/L (ref 35–104)
ALT SERPL-CCNC: 10 U/L (ref 5–33)
ANION GAP SERPL CALCULATED.3IONS-SCNC: 15 MMOL/L (ref 7–19)
AST SERPL-CCNC: 17 U/L (ref 5–32)
BASOPHILS ABSOLUTE: 0 K/UL (ref 0–0.2)
BASOPHILS RELATIVE PERCENT: 0.6 % (ref 0–1)
BILIRUB SERPL-MCNC: 0.4 MG/DL (ref 0.2–1.2)
BUN BLDV-MCNC: 21 MG/DL (ref 8–23)
CALCIUM SERPL-MCNC: 9.2 MG/DL (ref 8.8–10.2)
CHLORIDE BLD-SCNC: 104 MMOL/L (ref 98–111)
CO2: 24 MMOL/L (ref 22–29)
CREAT SERPL-MCNC: 1.3 MG/DL (ref 0.5–0.9)
EOSINOPHILS ABSOLUTE: 0.2 K/UL (ref 0–0.6)
EOSINOPHILS RELATIVE PERCENT: 3.6 % (ref 0–5)
GFR NON-AFRICAN AMERICAN: 41
GLUCOSE BLD-MCNC: 102 MG/DL (ref 74–109)
HCT VFR BLD CALC: 37.3 % (ref 37–47)
HEMOGLOBIN: 11.9 G/DL (ref 12–16)
LYMPHOCYTES ABSOLUTE: 1.8 K/UL (ref 1.1–4.5)
LYMPHOCYTES RELATIVE PERCENT: 28.1 % (ref 20–40)
MCH RBC QN AUTO: 29 PG (ref 27–31)
MCHC RBC AUTO-ENTMCNC: 31.9 G/DL (ref 33–37)
MCV RBC AUTO: 90.8 FL (ref 81–99)
MONOCYTES ABSOLUTE: 0.7 K/UL (ref 0–0.9)
MONOCYTES RELATIVE PERCENT: 10.4 % (ref 0–10)
NEUTROPHILS ABSOLUTE: 3.7 K/UL (ref 1.5–7.5)
NEUTROPHILS RELATIVE PERCENT: 57 % (ref 50–65)
PDW BLD-RTO: 12.4 % (ref 11.5–14.5)
PLATELET # BLD: 204 K/UL (ref 130–400)
PMV BLD AUTO: 11.2 FL (ref 9.4–12.3)
POTASSIUM SERPL-SCNC: 5.2 MMOL/L (ref 3.5–5)
RBC # BLD: 4.11 M/UL (ref 4.2–5.4)
SODIUM BLD-SCNC: 143 MMOL/L (ref 136–145)
TOTAL PROTEIN: 7.6 G/DL (ref 6.6–8.7)
WBC # BLD: 6.4 K/UL (ref 4.8–10.8)

## 2019-06-09 ENCOUNTER — APPOINTMENT (OUTPATIENT)
Dept: CARDIOLOGY | Facility: HOSPITAL | Age: 69
End: 2019-06-09

## 2019-06-09 ENCOUNTER — APPOINTMENT (OUTPATIENT)
Dept: GENERAL RADIOLOGY | Facility: HOSPITAL | Age: 69
End: 2019-06-09

## 2019-06-09 ENCOUNTER — APPOINTMENT (OUTPATIENT)
Dept: CT IMAGING | Facility: HOSPITAL | Age: 69
End: 2019-06-09

## 2019-06-09 ENCOUNTER — APPOINTMENT (OUTPATIENT)
Dept: MRI IMAGING | Facility: HOSPITAL | Age: 69
End: 2019-06-09

## 2019-06-09 ENCOUNTER — HOSPITAL ENCOUNTER (OUTPATIENT)
Facility: HOSPITAL | Age: 69
Setting detail: OBSERVATION
Discharge: HOME | End: 2019-06-11
Attending: FAMILY MEDICINE | Admitting: PSYCHIATRY & NEUROLOGY

## 2019-06-09 DIAGNOSIS — R13.10 DYSPHAGIA, UNSPECIFIED TYPE: Primary | ICD-10-CM

## 2019-06-09 DIAGNOSIS — R49.0 DYSPHONIA: ICD-10-CM

## 2019-06-09 DIAGNOSIS — G93.40 ENCEPHALOPATHY: ICD-10-CM

## 2019-06-09 LAB
ABO GROUP BLD: NORMAL
ALBUMIN SERPL-MCNC: 3.9 G/DL (ref 3.5–5)
ALBUMIN/GLOB SERPL: 1.1 G/DL (ref 1.1–2.5)
ALP SERPL-CCNC: 120 U/L (ref 24–120)
ALT SERPL W P-5'-P-CCNC: <15 U/L (ref 0–54)
ANION GAP SERPL CALCULATED.3IONS-SCNC: 14 MMOL/L (ref 4–13)
APTT PPP: 59.4 SECONDS (ref 24.1–35)
AST SERPL-CCNC: 30 U/L (ref 7–45)
BH CV ECHO MEAS - AO MAX PG (FULL): 12.1 MMHG
BH CV ECHO MEAS - AO MAX PG: 23.8 MMHG
BH CV ECHO MEAS - AO MEAN PG (FULL): 6 MMHG
BH CV ECHO MEAS - AO MEAN PG: 12 MMHG
BH CV ECHO MEAS - AO ROOT AREA (BSA CORRECTED): 1.3
BH CV ECHO MEAS - AO ROOT AREA: 5.7 CM^2
BH CV ECHO MEAS - AO ROOT DIAM: 2.7 CM
BH CV ECHO MEAS - AO V2 MAX: 244 CM/SEC
BH CV ECHO MEAS - AO V2 MEAN: 161.5 CM/SEC
BH CV ECHO MEAS - AO V2 VTI: 64.1 CM
BH CV ECHO MEAS - AVA(I,A): 2.7 CM^2
BH CV ECHO MEAS - AVA(I,D): 2.7 CM^2
BH CV ECHO MEAS - AVA(V,A): 2.4 CM^2
BH CV ECHO MEAS - AVA(V,D): 2.4 CM^2
BH CV ECHO MEAS - BSA(HAYCOCK): 2.1 M^2
BH CV ECHO MEAS - BSA: 2 M^2
BH CV ECHO MEAS - BZI_BMI: 27.3 KILOGRAMS/M^2
BH CV ECHO MEAS - BZI_METRIC_HEIGHT: 177.8 CM
BH CV ECHO MEAS - BZI_METRIC_WEIGHT: 86.2 KG
BH CV ECHO MEAS - EDV(CUBED): 124.3 ML
BH CV ECHO MEAS - EDV(MOD-SP4): 171 ML
BH CV ECHO MEAS - EDV(TEICH): 117.7 ML
BH CV ECHO MEAS - EF(CUBED): 64.6 %
BH CV ECHO MEAS - EF(MOD-SP4): 68.7 %
BH CV ECHO MEAS - EF(TEICH): 55.9 %
BH CV ECHO MEAS - ESV(CUBED): 44 ML
BH CV ECHO MEAS - ESV(MOD-SP4): 53.5 ML
BH CV ECHO MEAS - ESV(TEICH): 51.9 ML
BH CV ECHO MEAS - FS: 29.3 %
BH CV ECHO MEAS - IVS/LVPW: 0.64
BH CV ECHO MEAS - IVSD: 0.95 CM
BH CV ECHO MEAS - LA DIMENSION: 4.3 CM
BH CV ECHO MEAS - LA/AO: 1.6
BH CV ECHO MEAS - LAT PEAK E' VEL: 9.9 CM/SEC
BH CV ECHO MEAS - LV DIASTOLIC VOL/BSA (35-75): 83.7 ML/M^2
BH CV ECHO MEAS - LV MASS(C)D: 240.3 GRAMS
BH CV ECHO MEAS - LV MASS(C)DI: 117.7 GRAMS/M^2
BH CV ECHO MEAS - LV MAX PG: 11.7 MMHG
BH CV ECHO MEAS - LV MEAN PG: 6 MMHG
BH CV ECHO MEAS - LV SYSTOLIC VOL/BSA (12-30): 26.2 ML/M^2
BH CV ECHO MEAS - LV V1 MAX: 171 CM/SEC
BH CV ECHO MEAS - LV V1 MEAN: 115 CM/SEC
BH CV ECHO MEAS - LV V1 VTI: 49.5 CM
BH CV ECHO MEAS - LVIDD: 5 CM
BH CV ECHO MEAS - LVIDS: 3.5 CM
BH CV ECHO MEAS - LVLD AP4: 8.6 CM
BH CV ECHO MEAS - LVLS AP4: 6.2 CM
BH CV ECHO MEAS - LVOT AREA (M): 3.5 CM^2
BH CV ECHO MEAS - LVOT AREA: 3.5 CM^2
BH CV ECHO MEAS - LVOT DIAM: 2.1 CM
BH CV ECHO MEAS - LVPWD: 1.5 CM
BH CV ECHO MEAS - MED PEAK E' VEL: 7.2 CM/SEC
BH CV ECHO MEAS - MR MAX PG: 58.7 MMHG
BH CV ECHO MEAS - MR MAX VEL: 383 CM/SEC
BH CV ECHO MEAS - MV A MAX VEL: 114 CM/SEC
BH CV ECHO MEAS - MV DEC SLOPE: 374 CM/SEC^2
BH CV ECHO MEAS - MV DEC TIME: 0.37 SEC
BH CV ECHO MEAS - MV E MAX VEL: 137 CM/SEC
BH CV ECHO MEAS - MV E/A: 1.2
BH CV ECHO MEAS - RAP SYSTOLE: 10 MMHG
BH CV ECHO MEAS - RVSP: 40.9 MMHG
BH CV ECHO MEAS - SI(AO): 179.7 ML/M^2
BH CV ECHO MEAS - SI(CUBED): 39.3 ML/M^2
BH CV ECHO MEAS - SI(LVOT): 83.9 ML/M^2
BH CV ECHO MEAS - SI(MOD-SP4): 57.5 ML/M^2
BH CV ECHO MEAS - SI(TEICH): 32.2 ML/M^2
BH CV ECHO MEAS - SV(AO): 367 ML
BH CV ECHO MEAS - SV(CUBED): 80.3 ML
BH CV ECHO MEAS - SV(LVOT): 171.4 ML
BH CV ECHO MEAS - SV(MOD-SP4): 117.5 ML
BH CV ECHO MEAS - SV(TEICH): 65.8 ML
BH CV ECHO MEAS - TR MAX VEL: 278 CM/SEC
BH CV ECHO MEASUREMENTS AVERAGE E/E' RATIO: 16.02
BILIRUB SERPL-MCNC: 0.3 MG/DL (ref 0.1–1)
BLD GP AB SCN SERPL QL: NEGATIVE
BUN BLD-MCNC: 33 MG/DL (ref 5–21)
BUN/CREAT SERPL: 12.9 (ref 7–25)
CALCIUM SPEC-SCNC: 9.1 MG/DL (ref 8.4–10.4)
CHLORIDE SERPL-SCNC: 105 MMOL/L (ref 98–110)
CO2 SERPL-SCNC: 20 MMOL/L (ref 24–31)
CREAT BLD-MCNC: 2.56 MG/DL (ref 0.5–1.4)
DEPRECATED RDW RBC AUTO: 37.3 FL (ref 40–54)
EOSINOPHIL # BLD MANUAL: 0.21 10*3/MM3 (ref 0–0.7)
EOSINOPHIL NFR BLD MANUAL: 3 % (ref 0–4)
ERYTHROCYTE [DISTWIDTH] IN BLOOD BY AUTOMATED COUNT: 12.2 % (ref 12–15)
GFR SERPL CREATININE-BSD FRML MDRD: 19 ML/MIN/1.73
GLOBULIN UR ELPH-MCNC: 3.7 GM/DL
GLUCOSE BLD-MCNC: 132 MG/DL (ref 70–100)
GLUCOSE BLDC GLUCOMTR-MCNC: 147 MG/DL (ref 70–130)
HCT VFR BLD AUTO: 32.3 % (ref 37–47)
HGB BLD-MCNC: 11 G/DL (ref 12–16)
HOLD SPECIMEN: NORMAL
HOLD SPECIMEN: NORMAL
INR PPP: 1.56 (ref 0.91–1.09)
LEFT ATRIUM VOLUME INDEX: 35.2 ML/M2
LEFT ATRIUM VOLUME: 71.9 CM3
LYMPHOCYTES # BLD MANUAL: 0.89 10*3/MM3 (ref 0.72–4.86)
LYMPHOCYTES NFR BLD MANUAL: 12 % (ref 4–12)
LYMPHOCYTES NFR BLD MANUAL: 13 % (ref 15–45)
MAXIMAL PREDICTED HEART RATE: 152 BPM
MCH RBC QN AUTO: 28.6 PG (ref 28–32)
MCHC RBC AUTO-ENTMCNC: 34.1 G/DL (ref 33–36)
MCV RBC AUTO: 84.1 FL (ref 82–98)
MONOCYTES # BLD AUTO: 0.82 10*3/MM3 (ref 0.19–1.3)
NEUTROPHILS # BLD AUTO: 4.8 10*3/MM3 (ref 1.87–8.4)
NEUTROPHILS NFR BLD MANUAL: 67 % (ref 39–78)
NEUTS BAND NFR BLD MANUAL: 3 % (ref 0–10)
PLAT MORPH BLD: NORMAL
PLATELET # BLD AUTO: 209 10*3/MM3 (ref 130–400)
PMV BLD AUTO: 10.7 FL (ref 6–12)
POTASSIUM BLD-SCNC: 3.7 MMOL/L (ref 3.5–5.3)
PROT SERPL-MCNC: 7.6 G/DL (ref 6.3–8.7)
PROTHROMBIN TIME: 19.2 SECONDS (ref 11.9–14.6)
RBC # BLD AUTO: 3.84 10*6/MM3 (ref 4.2–5.4)
RBC MORPH BLD: NORMAL
RH BLD: POSITIVE
SODIUM BLD-SCNC: 139 MMOL/L (ref 135–145)
STRESS TARGET HR: 129 BPM
T&S EXPIRATION DATE: NORMAL
TROPONIN I SERPL-MCNC: <0.012 NG/ML (ref 0–0.03)
VARIANT LYMPHS NFR BLD MANUAL: 2 % (ref 0–5)
WBC MORPH BLD: NORMAL
WBC NRBC COR # BLD: 6.85 10*3/MM3 (ref 4.8–10.8)
WHOLE BLOOD HOLD SPECIMEN: NORMAL
WHOLE BLOOD HOLD SPECIMEN: NORMAL

## 2019-06-09 PROCEDURE — 86901 BLOOD TYPING SEROLOGIC RH(D): CPT | Performed by: FAMILY MEDICINE

## 2019-06-09 PROCEDURE — G0378 HOSPITAL OBSERVATION PER HR: HCPCS

## 2019-06-09 PROCEDURE — 96361 HYDRATE IV INFUSION ADD-ON: CPT

## 2019-06-09 PROCEDURE — 99220 PR INITIAL OBSERVATION CARE/DAY 70 MINUTES: CPT | Performed by: PSYCHIATRY & NEUROLOGY

## 2019-06-09 PROCEDURE — 70450 CT HEAD/BRAIN W/O DYE: CPT

## 2019-06-09 PROCEDURE — 85730 THROMBOPLASTIN TIME PARTIAL: CPT | Performed by: FAMILY MEDICINE

## 2019-06-09 PROCEDURE — A9577 INJ MULTIHANCE: HCPCS | Performed by: PSYCHIATRY & NEUROLOGY

## 2019-06-09 PROCEDURE — 0 GADOBENATE DIMEGLUMINE 529 MG/ML SOLUTION: Performed by: PSYCHIATRY & NEUROLOGY

## 2019-06-09 PROCEDURE — 99284 EMERGENCY DEPT VISIT MOD MDM: CPT

## 2019-06-09 PROCEDURE — 70553 MRI BRAIN STEM W/O & W/DYE: CPT

## 2019-06-09 PROCEDURE — 80053 COMPREHEN METABOLIC PANEL: CPT | Performed by: FAMILY MEDICINE

## 2019-06-09 PROCEDURE — 86900 BLOOD TYPING SEROLOGIC ABO: CPT | Performed by: FAMILY MEDICINE

## 2019-06-09 PROCEDURE — 25010000002 PERFLUTREN 6.52 MG/ML SUSPENSION: Performed by: PSYCHIATRY & NEUROLOGY

## 2019-06-09 PROCEDURE — 93306 TTE W/DOPPLER COMPLETE: CPT

## 2019-06-09 PROCEDURE — 85007 BL SMEAR W/DIFF WBC COUNT: CPT | Performed by: FAMILY MEDICINE

## 2019-06-09 PROCEDURE — 81003 URINALYSIS AUTO W/O SCOPE: CPT | Performed by: FAMILY MEDICINE

## 2019-06-09 PROCEDURE — 84484 ASSAY OF TROPONIN QUANT: CPT | Performed by: FAMILY MEDICINE

## 2019-06-09 PROCEDURE — 86850 RBC ANTIBODY SCREEN: CPT | Performed by: FAMILY MEDICINE

## 2019-06-09 PROCEDURE — 71045 X-RAY EXAM CHEST 1 VIEW: CPT

## 2019-06-09 PROCEDURE — 96360 HYDRATION IV INFUSION INIT: CPT

## 2019-06-09 PROCEDURE — 82962 GLUCOSE BLOOD TEST: CPT

## 2019-06-09 PROCEDURE — 85610 PROTHROMBIN TIME: CPT | Performed by: FAMILY MEDICINE

## 2019-06-09 PROCEDURE — 93010 ELECTROCARDIOGRAM REPORT: CPT | Performed by: INTERNAL MEDICINE

## 2019-06-09 PROCEDURE — 93005 ELECTROCARDIOGRAM TRACING: CPT | Performed by: FAMILY MEDICINE

## 2019-06-09 PROCEDURE — 85025 COMPLETE CBC W/AUTO DIFF WBC: CPT | Performed by: FAMILY MEDICINE

## 2019-06-09 PROCEDURE — 93306 TTE W/DOPPLER COMPLETE: CPT | Performed by: INTERNAL MEDICINE

## 2019-06-09 RX ORDER — FLUOXETINE HYDROCHLORIDE 20 MG/1
20 CAPSULE ORAL DAILY
Status: DISCONTINUED | OUTPATIENT
Start: 2019-06-09 | End: 2019-06-09 | Stop reason: SDUPTHER

## 2019-06-09 RX ORDER — FUROSEMIDE 20 MG/1
10 TABLET ORAL DAILY
Status: DISCONTINUED | OUTPATIENT
Start: 2019-06-09 | End: 2019-06-09 | Stop reason: SDUPTHER

## 2019-06-09 RX ORDER — CETIRIZINE HYDROCHLORIDE 10 MG/1
10 TABLET ORAL DAILY
Status: DISCONTINUED | OUTPATIENT
Start: 2019-06-10 | End: 2019-06-11 | Stop reason: HOSPADM

## 2019-06-09 RX ORDER — FUROSEMIDE 20 MG/1
10 TABLET ORAL DAILY
Status: DISCONTINUED | OUTPATIENT
Start: 2019-06-10 | End: 2019-06-11 | Stop reason: HOSPADM

## 2019-06-09 RX ORDER — SODIUM CHLORIDE 0.9 % (FLUSH) 0.9 %
3 SYRINGE (ML) INJECTION EVERY 12 HOURS SCHEDULED
Status: DISCONTINUED | OUTPATIENT
Start: 2019-06-09 | End: 2019-06-11 | Stop reason: HOSPADM

## 2019-06-09 RX ORDER — ATORVASTATIN CALCIUM 40 MG/1
80 TABLET, FILM COATED ORAL NIGHTLY
Status: DISCONTINUED | OUTPATIENT
Start: 2019-06-10 | End: 2019-06-11 | Stop reason: HOSPADM

## 2019-06-09 RX ORDER — CETIRIZINE HYDROCHLORIDE 10 MG/1
10 TABLET ORAL DAILY
Status: DISCONTINUED | OUTPATIENT
Start: 2019-06-09 | End: 2019-06-09 | Stop reason: SDUPTHER

## 2019-06-09 RX ORDER — FLUOXETINE HYDROCHLORIDE 20 MG/1
20 CAPSULE ORAL DAILY
Status: DISCONTINUED | OUTPATIENT
Start: 2019-06-09 | End: 2019-06-11 | Stop reason: HOSPADM

## 2019-06-09 RX ORDER — PANTOPRAZOLE SODIUM 40 MG/1
40 TABLET, DELAYED RELEASE ORAL
Status: DISCONTINUED | OUTPATIENT
Start: 2019-06-10 | End: 2019-06-11 | Stop reason: HOSPADM

## 2019-06-09 RX ORDER — LEVOTHYROXINE SODIUM 88 UG/1
88 TABLET ORAL
Status: DISCONTINUED | OUTPATIENT
Start: 2019-06-10 | End: 2019-06-11 | Stop reason: HOSPADM

## 2019-06-09 RX ORDER — MELOXICAM 7.5 MG/1
7.5 TABLET ORAL DAILY
Status: DISCONTINUED | OUTPATIENT
Start: 2019-06-09 | End: 2019-06-09 | Stop reason: SDUPTHER

## 2019-06-09 RX ORDER — ATORVASTATIN CALCIUM 10 MG/1
20 TABLET, FILM COATED ORAL DAILY
Status: DISCONTINUED | OUTPATIENT
Start: 2019-06-09 | End: 2019-06-09 | Stop reason: SDUPTHER

## 2019-06-09 RX ORDER — SODIUM CHLORIDE 0.9 % (FLUSH) 0.9 %
10 SYRINGE (ML) INJECTION AS NEEDED
Status: DISCONTINUED | OUTPATIENT
Start: 2019-06-09 | End: 2019-06-11 | Stop reason: HOSPADM

## 2019-06-09 RX ORDER — PAROXETINE HYDROCHLORIDE 20 MG/1
40 TABLET, FILM COATED ORAL DAILY
Status: DISCONTINUED | OUTPATIENT
Start: 2019-06-09 | End: 2019-06-09 | Stop reason: SDUPTHER

## 2019-06-09 RX ORDER — ATORVASTATIN CALCIUM 40 MG/1
80 TABLET, FILM COATED ORAL NIGHTLY
Status: DISCONTINUED | OUTPATIENT
Start: 2019-06-09 | End: 2019-06-09 | Stop reason: SDUPTHER

## 2019-06-09 RX ORDER — GEMFIBROZIL 600 MG/1
600 TABLET, FILM COATED ORAL
COMMUNITY
End: 2022-10-11

## 2019-06-09 RX ORDER — SODIUM CHLORIDE 0.9 % (FLUSH) 0.9 %
3-10 SYRINGE (ML) INJECTION AS NEEDED
Status: DISCONTINUED | OUTPATIENT
Start: 2019-06-09 | End: 2019-06-11 | Stop reason: HOSPADM

## 2019-06-09 RX ORDER — PAROXETINE HYDROCHLORIDE 20 MG/1
40 TABLET, FILM COATED ORAL DAILY
Status: DISCONTINUED | OUTPATIENT
Start: 2019-06-09 | End: 2019-06-10 | Stop reason: SDUPTHER

## 2019-06-09 RX ORDER — CLOPIDOGREL BISULFATE 75 MG/1
75 TABLET ORAL DAILY
Status: DISCONTINUED | OUTPATIENT
Start: 2019-06-10 | End: 2019-06-11 | Stop reason: HOSPADM

## 2019-06-09 RX ORDER — SODIUM CHLORIDE 9 MG/ML
100 INJECTION, SOLUTION INTRAVENOUS CONTINUOUS
Status: DISCONTINUED | OUTPATIENT
Start: 2019-06-09 | End: 2019-06-11 | Stop reason: HOSPADM

## 2019-06-09 RX ORDER — ACETAMINOPHEN 325 MG/1
650 TABLET ORAL EVERY 4 HOURS PRN
Status: DISCONTINUED | OUTPATIENT
Start: 2019-06-09 | End: 2019-06-11 | Stop reason: HOSPADM

## 2019-06-09 RX ORDER — MELOXICAM 7.5 MG/1
7.5 TABLET ORAL DAILY
Status: DISCONTINUED | OUTPATIENT
Start: 2019-06-10 | End: 2019-06-11 | Stop reason: HOSPADM

## 2019-06-09 RX ADMIN — SODIUM CHLORIDE 100 ML/HR: 9 INJECTION, SOLUTION INTRAVENOUS at 16:53

## 2019-06-09 RX ADMIN — GADOBENATE DIMEGLUMINE 16 ML: 529 INJECTION, SOLUTION INTRAVENOUS at 17:30

## 2019-06-09 RX ADMIN — SODIUM CHLORIDE 1000 ML: 9 INJECTION, SOLUTION INTRAVENOUS at 12:16

## 2019-06-09 RX ADMIN — PERFLUTREN 8.48 MG: 6.52 INJECTION, SUSPENSION INTRAVENOUS at 15:07

## 2019-06-09 NOTE — H&P
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  She reportedly for the past week has had generalized malaise.  She describes fullness in her head including frontal sinus fullness as well.  Her primary care provider;  MONIE Gibson, has been treating her with antibiotics reportedly.  She reports Reiger's but no objective fevers.  She does report some chills.  She denies foreign travel.  She denies tick bites.  She denies any sick contacts.  She currently does not have a headache.  She went to work this morning around 10 AM.  She started feeling even more poor with generalized malaise.  Her coworkers believe that her speech was disrupted.  She denies any drooling.  She denies any facial weakness.  She denies any unilateral numbness or weakness.  She denies any alteration of consciousness.  She was taken to the ER where a code stroke was activated.  A stat head CT without contrast showed no acute findings.  She believes that her speech is back to normal.  Her son who is at the bedside does not believe that her speech has returned to normal.  He believes that her speech is somewhat stumbling.      Past Medical History:   Diagnosis Date   • Chronic laryngitis 12/4/2017   • Chronic rhinitis 12/4/2017   • Depression    • Goiter 12/4/2017   • High cholesterol    • Hypertension    • Hypothyroidism    • LPRD (laryngopharyngeal reflux disease) 12/4/2017   • Postoperative hypothyroidism 12/4/2017       Allergies   Allergen Reactions   • Ace Inhibitors    • Amlodipine Besy-Benazepril Hcl    • Aspirin    • B12 Folate  [Folic Acid-Vit B6-Vit B12]    • Codeine    • Demerol [Meperidine]    • Detrol  [Tolterodine Tartrate]    • Latex    • Morphine And Related    • Penicillins    • Sulfa Antibiotics        (Not in a  hospital admission)    Social History     Socioeconomic History   • Marital status:      Spouse name: Not on file   • Number of children: Not on file   • Years of education: Not on file   • Highest education level: Not on file   Tobacco Use   • Smoking status: Never Smoker   • Smokeless tobacco: Never Used   Substance and Sexual Activity   • Alcohol use: No   • Drug use: No     Family History   Problem Relation Age of Onset   • No Known Problems Father    • No Known Problems Mother    • Kidney cancer Sister    • Prostate cancer Brother        Review of Systems  Review of Systems -a 14 point review of systems is performed and is positive only for malaise and a headache.    Vital Signs   Temp:  [97.7 °F (36.5 °C)] 97.7 °F (36.5 °C)  Heart Rate:  [55-59] 55  Resp:  [16-18] 16  BP: ()/(47-57) 108/47    General Exam:  Head:  Normal cephalic, atraumatic  HEENT:  Neck supple  Fundoscopic Exam:  No signs of disc edema  CVS:  Regular rate and rhythm.  No murmurs  Carotid Examination:  No bruits  Lungs:  Clear to auscultation  Abdomen:  Non-tender, Non-distended  Extremities:  No signs of peripheral edema  Skin:  No rashes    Neurologic Exam:    Mental Status:    -Awake, Alert, Oriented X 3  -No word finding difficulties  -No aphasia  -Extremely mild dysarthria.  She stumbles on her words somewhat but can get her words out.  She believes that she is at her baseline.  Her son thinks otherwise.  -Follows simple and complex commands    CN II:  Visual fields full.  Pupils equally reactive to light  CN III, IV, VI:  Extraocular Muscles full with no signs of nystagmus  CN V:  Facial sensory is symmetric with no asymmetries.  CN VII:  Facial motor symmetric  CN VIII:  Gross hearing intact bilaterally  CN IX:  Palate elevates symmetrically  CN X:  Palate elevates symmetrically  CN XI:  Shoulder shrug symmetric  CN XII:  Tongue is midline on protrusion    Motor: (strength out of 5:  1= minimal movement, 2 = movement in  plane of gravity, 3 = movement against gravity, 4 = movement against some resistance, 5 = full strength)    -Right Upper Ext: Proximal: 5 Distal: 5  -Left Upper Ext: Proximal: 5 Distal: 5    -Right Lower Ext: Proximal: 5 Distal: 5  -Left Lower Ext: Proximal: 5 Distal: 5    DTR:  -Right   Bicep: 2+ Tricep: 2+ Brachoradialis: 2+   Patella: 2+ Ankle: 2+ Neg Babinski  -Left   Bicep: 2+ Tricep: 2+ Brachoradialis: 2+   Patella: 2+ Ankle: 2+ Neg Babinski    Sensory:  -Intact to light touch, pinprick, temperature, pain, and proprioception    Coordination:  -Finger to nose intact  -Heel to shin intact  -No ataxia        Results Review:  Lab Results (last 7 days)     Procedure Component Value Units Date/Time    Comprehensive Metabolic Panel [[id number]]  (Abnormal) Collected:  06/09/19 1151    Specimen:  Blood Updated:  06/09/19 1212     Glucose 132 mg/dL      BUN 33 mg/dL      Creatinine 2.56 mg/dL      Sodium 139 mmol/L      Potassium 3.7 mmol/L      Chloride 105 mmol/L      CO2 20.0 mmol/L      Calcium 9.1 mg/dL      Total Protein 7.6 g/dL      Albumin 3.90 g/dL      ALT (SGPT) <15 U/L      AST (SGOT) 30 U/L      Alkaline Phosphatase 120 U/L      Total Bilirubin 0.3 mg/dL      eGFR Non African Amer 19 mL/min/1.73      Globulin 3.7 gm/dL      A/G Ratio 1.1 g/dL      BUN/Creatinine Ratio 12.9     Anion Gap 14.0 mmol/L     Narrative:       GFR Normal >60  Chronic Kidney Disease <60  Kidney Failure <15    Protime-INR [[id number]]  (Abnormal) Collected:  06/09/19 1151    Specimen:  Blood Updated:  06/09/19 1211     Protime 19.2 Seconds      INR 1.56    aPTT [[id number]]  (Abnormal) Collected:  06/09/19 1151    Specimen:  Blood Updated:  06/09/19 1211     PTT 59.4 seconds     CBC Auto Differential [[id number]]  (Abnormal) Collected:  06/09/19 1151    Specimen:  Blood Updated:  06/09/19 1203     WBC 6.85 10*3/mm3      RBC 3.84 10*6/mm3      Hemoglobin 11.0 g/dL      Hematocrit 32.3 %      MCV 84.1 fL      MCH 28.6 pg      MCHC 34.1  g/dL      RDW 12.2 %      RDW-SD 37.3 fl      MPV 10.7 fL      Platelets 209 10*3/mm3      nRBC 0.0 /100 WBC     Manual Differential [[id number]] Collected:  06/09/19 1151    Specimen:  Blood Updated:  06/09/19 1202    Troponin [[id number]] Collected:  06/09/19 1151    Specimen:  Blood Updated:  06/09/19 1157    Green Top (Gel) [[id number]] Collected:  06/09/19 1151    Specimen:  Blood Updated:  06/09/19 1157    Red Top [[id number]] Collected:  06/09/19 1151    Specimen:  Blood Updated:  06/09/19 1157    CBC & Differential [[id number]] Collected:  06/09/19 1151    Specimen:  Blood Updated:  06/09/19 1157    Narrative:       The following orders were created for panel order CBC & Differential.  Procedure                               Abnormality         Status                     ---------                               -----------         ------                     CBC Auto Differential[[id number]]        Abnormal            Preliminary result           Please view results for these tests on the individual orders.    Lavender Top [[id number]] Collected:  06/09/19 1151    Specimen:  Blood Updated:  06/09/19 1157    [place] Draw [[id number]] Collected:  06/09/19 1151    Specimen:  Blood Updated:  06/09/19 1157    Narrative:       The following orders were created for panel order [place] Draw.  Procedure                               Abnormality         Status                     ---------                               -----------         ------                     Light Blue Top[[id number]]                                   In process                 Green Top (Gel)[[id number]]                                  In process                 Lavender Top[[id number]]                                     In process                 Red Top[[id number]]                                          In process                   Please view results for these tests on the individual orders.    Light Blue Top [[id number]] Collected:  06/09/19 1151     Specimen:  Blood Updated:  06/09/19 1157    POC Glucose Once [[id number]]  (Abnormal) Collected:  06/09/19 1132    Specimen:  Blood Updated:  06/09/19 1145     Glucose 147 mg/dL      Comment: : [id number] Moon ID: [id number]           Patient Active Problem List   Diagnosis   • Pharyngoesophageal dysphagia   • LPRD (laryngopharyngeal reflux disease)   • Chronic laryngitis   • Postoperative hypothyroidism   • Goiter   • Chronic rhinitis   • Encephalopathy     CT of the head without contrast reviewed by me.  I think there is a greater amount of periventricular white matter disease and is typical for her age.  I see no evidence of acute findings.      Impression:    1.  Abnormal speech  2.  Hypotension  3.  Acute Kidney Injury  4.  Subjective fevers and rigors for several days  5.  History of brainstem encephalitis  6.  Malaise    Discussion: The etiology behind her event is somewhat unclear.  I do want to consider ischemic causes; however, I do not feel comfortable giving her TPA.  Her last seen normal time is somewhat difficult to establish.  Her speech abnormalities were noted around 10 AM but she does endorse several days worth of overall malaise and headache.  In addition to this objectively she has minimal findings and her NIH stroke scale it would at most be a 1.  I do have a concern for a generalized viral illness.  It may be that what ever overlying disease state is occurring currently may be worsening her previous symptoms from her brainstem encephalitis.  This may be the etiology behind her speech abnormalities.  She also has metabolic reasons for malaise including acute kidney injury.  Recommendations are as follows.    Plan:    · Admit to obs status  · Normal saline for renal dysfunction  · Will do MRI Brain with and without contrast if renal function improves overnight with fluids  · Otherwise will change to without contrast  · Daily labs  · ID consult  · Cardiac echo  · Will follow  stroke protocols until ruled out  · Will hold home anti-HTN meds as is currently hypotensive    Kiran Domínguez MD  06/09/19  12:31 PM

## 2019-06-09 NOTE — SIGNIFICANT NOTE
ST chart review completed. Pt down for testing upon ST arrival. RN stated pt just recently left floor and would not be back for awhile. ST to follow up in morning.   Tika Varela, CCC-SLP 6/9/2019 3:12 PM       06/09/19 1452   Rehab Time/Intention   Evaluation Not Performed patient unavailable for evaluation  (testing)   Rehab Treatment   Discipline speech language pathologist

## 2019-06-09 NOTE — ED PROVIDER NOTES
Subjective   The patient is a 69-year-old female with a history of hypertension and what she describes as a brainstem encephalitis as a child who this morning was noted at work to have sudden onset slurred speech at 10 AM.  Coworkers called her son who 20 minutes later arrived and that time the coworkers described a worsening of her speech problem.  Her son also describes severe pallor.  The questioning the patient describes a few days history of malaise, just not feeling right, but denies a documented fever, shaking chills or other focus of infection.  Denies other neurologic symptoms no change in her visual acuity no focal weakness or sensation deficit.            Review of Systems   Constitutional: Positive for fatigue.   HENT: Negative.    Eyes: Negative.    Respiratory: Negative.    Cardiovascular: Negative.    Gastrointestinal: Negative.    Endocrine: Negative.    Genitourinary: Negative.    Musculoskeletal: Negative.    Neurological: Positive for speech difficulty.       Past Medical History:   Diagnosis Date   • Chronic laryngitis 12/4/2017   • Chronic rhinitis 12/4/2017   • Depression    • Goiter 12/4/2017   • High cholesterol    • Hypertension    • Hypothyroidism    • LPRD (laryngopharyngeal reflux disease) 12/4/2017   • Postoperative hypothyroidism 12/4/2017       Allergies   Allergen Reactions   • Ace Inhibitors    • Amlodipine Besy-Benazepril Hcl    • Aspirin    • B12 Folate  [Folic Acid-Vit B6-Vit B12]    • Codeine    • Demerol [Meperidine]    • Detrol  [Tolterodine Tartrate]    • Latex    • Morphine And Related    • Penicillins    • Sulfa Antibiotics        Past Surgical History:   Procedure Laterality Date   • BLADDER SURGERY      sling   • HYSTERECTOMY     • THYROIDECTOMY, PARTIAL     • THYROIDECTOMY, PARTIAL Left    • TONSILLECTOMY     • TONSILLECTOMY         Family History   Problem Relation Age of Onset   • No Known Problems Father    • No Known Problems Mother    • Kidney cancer Sister    •  Prostate cancer Brother        Social History     Socioeconomic History   • Marital status:      Spouse name: Not on file   • Number of children: Not on file   • Years of education: Not on file   • Highest education level: Not on file   Tobacco Use   • Smoking status: Never Smoker   • Smokeless tobacco: Never Used   Substance and Sexual Activity   • Alcohol use: No   • Drug use: No           Objective   Physical Exam   Constitutional: She appears well-developed and well-nourished.   HENT:   Head: Normocephalic and atraumatic.   Mouth/Throat: Oropharynx is clear and moist.   Eyes: Conjunctivae and EOM are normal.   Neck: Normal range of motion. Neck supple.   Cardiovascular: Normal rate, regular rhythm, normal heart sounds and intact distal pulses.   Pulmonary/Chest: Effort normal and breath sounds normal.   Abdominal: Soft. Bowel sounds are normal.   Musculoskeletal: Normal range of motion.   Neurological: She is alert. She has normal strength and normal reflexes. No cranial nerve deficit or sensory deficit. She displays a negative Romberg sign. GCS eye subscore is 4. GCS verbal subscore is 5. GCS motor subscore is 6.   On presentation the son describes continuing mildly slurred speech which was not entirely evident to me or the nursing staff.       Procedures           ED Course        We called code stroke on this patient because of the sudden onset at 10 AM this morning of speech difficulties however the picture is more complicated than that and in fact she has been complaining of headache and not feeling well for the last few days so the decision was taken by Dr. Domínguez to not give TPA.  He did however recommend admission for observation and MRI and correction of her worsening renal function.  She is currently stable with improved blood pressure decreased pallor and a normal neurologic exam.          MDM  Number of Diagnoses or Management Options     Amount and/or Complexity of Data Reviewed  Clinical  lab tests: ordered and reviewed  Tests in the radiology section of CPT®: ordered and reviewed    Critical Care  Total time providing critical care: < 30 minutes    Patient Progress  Patient progress: improved        Final diagnoses:   None            Khanh Holm MD  06/09/19 [id number]

## 2019-06-10 LAB
ANION GAP SERPL CALCULATED.3IONS-SCNC: 10 MMOL/L (ref 4–13)
ARTERIAL PATENCY WRIST A: POSITIVE
ARTICHOKE IGE QN: 63 MG/DL (ref 0–99)
ATMOSPHERIC PRESS: 754 MMHG
BASE EXCESS BLDA CALC-SCNC: -6.8 MMOL/L (ref 0–2)
BASOPHILS # BLD AUTO: 0.01 10*3/MM3 (ref 0–0.2)
BASOPHILS NFR BLD AUTO: 0.2 % (ref 0–2)
BDY SITE: ABNORMAL
BILIRUB UR QL STRIP: NEGATIVE
BODY TEMPERATURE: 37 C
BUN BLD-MCNC: 30 MG/DL (ref 5–21)
BUN/CREAT SERPL: 17.6 (ref 7–25)
CALCIUM SPEC-SCNC: 8.5 MG/DL (ref 8.4–10.4)
CHLORIDE SERPL-SCNC: 112 MMOL/L (ref 98–110)
CHOLEST SERPL-MCNC: 112 MG/DL (ref 130–200)
CLARITY UR: CLEAR
CO2 SERPL-SCNC: 19 MMOL/L (ref 24–31)
COLOR UR: YELLOW
CREAT BLD-MCNC: 1.7 MG/DL (ref 0.5–1.4)
CREAT UR-MCNC: 52.7 MG/DL
DEPRECATED RDW RBC AUTO: 37.9 FL (ref 40–54)
EOSINOPHIL # BLD AUTO: 0.13 10*3/MM3 (ref 0–0.7)
EOSINOPHIL NFR BLD AUTO: 2.6 % (ref 0–4)
ERYTHROCYTE [DISTWIDTH] IN BLOOD BY AUTOMATED COUNT: 12.4 % (ref 12–15)
GFR SERPL CREATININE-BSD FRML MDRD: 30 ML/MIN/1.73
GLUCOSE BLD-MCNC: 83 MG/DL (ref 70–100)
GLUCOSE BLDC GLUCOMTR-MCNC: 170 MG/DL (ref 70–130)
GLUCOSE BLDC GLUCOMTR-MCNC: 85 MG/DL (ref 70–130)
GLUCOSE UR STRIP-MCNC: NEGATIVE MG/DL
HBA1C MFR BLD: 5.6 %
HCO3 BLDA-SCNC: 17.8 MMOL/L (ref 20–26)
HCT VFR BLD AUTO: 28.7 % (ref 37–47)
HDLC SERPL-MCNC: 14 MG/DL
HGB BLD-MCNC: 9.7 G/DL (ref 12–16)
HGB UR QL STRIP.AUTO: NEGATIVE
IMM GRANULOCYTES # BLD AUTO: 0.02 10*3/MM3 (ref 0–0.05)
IMM GRANULOCYTES NFR BLD AUTO: 0.4 % (ref 0–5)
KETONES UR QL STRIP: NEGATIVE
LDLC/HDLC SERPL: 3.91 {RATIO}
LEUKOCYTE ESTERASE UR QL STRIP.AUTO: NEGATIVE
LYMPHOCYTES # BLD AUTO: 1.38 10*3/MM3 (ref 0.72–4.86)
LYMPHOCYTES NFR BLD AUTO: 27.7 % (ref 15–45)
Lab: ABNORMAL
MCH RBC QN AUTO: 28.4 PG (ref 28–32)
MCHC RBC AUTO-ENTMCNC: 33.8 G/DL (ref 33–36)
MCV RBC AUTO: 84.2 FL (ref 82–98)
MODALITY: ABNORMAL
MONOCYTES # BLD AUTO: 0.49 10*3/MM3 (ref 0.19–1.3)
MONOCYTES NFR BLD AUTO: 9.8 % (ref 4–12)
NEUTROPHILS # BLD AUTO: 2.95 10*3/MM3 (ref 1.87–8.4)
NEUTROPHILS NFR BLD AUTO: 59.3 % (ref 39–78)
NITRITE UR QL STRIP: NEGATIVE
NRBC BLD AUTO-RTO: 0 /100 WBC (ref 0–0.2)
PCO2 BLDA: 31.9 MM HG (ref 35–45)
PH BLDA: 7.36 PH UNITS (ref 7.35–7.45)
PH UR STRIP.AUTO: <=5 [PH] (ref 5–8)
PLATELET # BLD AUTO: 179 10*3/MM3 (ref 130–400)
PMV BLD AUTO: 11.3 FL (ref 6–12)
PO2 BLDA: 93.1 MM HG (ref 83–108)
POTASSIUM BLD-SCNC: 3.8 MMOL/L (ref 3.5–5.3)
PROT UR QL STRIP: NEGATIVE
RBC # BLD AUTO: 3.41 10*6/MM3 (ref 4.2–5.4)
SAO2 % BLDCOA: 97.4 % (ref 94–99)
SODIUM BLD-SCNC: 141 MMOL/L (ref 135–145)
SODIUM UR-SCNC: 98 MMOL/L (ref 30–90)
SP GR UR STRIP: 1.01 (ref 1–1.03)
TRIGL SERPL-MCNC: 216 MG/DL (ref 0–149)
UROBILINOGEN UR QL STRIP: NORMAL
UUN 24H UR-MCNC: 364 MG/DL
VENTILATOR MODE: ABNORMAL
WBC NRBC COR # BLD: 4.98 10*3/MM3 (ref 4.8–10.8)

## 2019-06-10 PROCEDURE — 36600 WITHDRAWAL OF ARTERIAL BLOOD: CPT

## 2019-06-10 PROCEDURE — 85025 COMPLETE CBC W/AUTO DIFF WBC: CPT | Performed by: PSYCHIATRY & NEUROLOGY

## 2019-06-10 PROCEDURE — 97165 OT EVAL LOW COMPLEX 30 MIN: CPT | Performed by: OCCUPATIONAL THERAPIST

## 2019-06-10 PROCEDURE — 97161 PT EVAL LOW COMPLEX 20 MIN: CPT | Performed by: PHYSICAL THERAPIST

## 2019-06-10 PROCEDURE — 96361 HYDRATE IV INFUSION ADD-ON: CPT

## 2019-06-10 PROCEDURE — G0378 HOSPITAL OBSERVATION PER HR: HCPCS

## 2019-06-10 PROCEDURE — 80048 BASIC METABOLIC PNL TOTAL CA: CPT | Performed by: PSYCHIATRY & NEUROLOGY

## 2019-06-10 PROCEDURE — 99218 PR INITIAL OBSERVATION CARE/DAY 30 MINUTES: CPT | Performed by: PSYCHIATRY & NEUROLOGY

## 2019-06-10 PROCEDURE — 82803 BLOOD GASES ANY COMBINATION: CPT

## 2019-06-10 PROCEDURE — 82570 ASSAY OF URINE CREATININE: CPT | Performed by: INTERNAL MEDICINE

## 2019-06-10 PROCEDURE — 80061 LIPID PANEL: CPT | Performed by: PSYCHIATRY & NEUROLOGY

## 2019-06-10 PROCEDURE — 82962 GLUCOSE BLOOD TEST: CPT

## 2019-06-10 PROCEDURE — 83036 HEMOGLOBIN GLYCOSYLATED A1C: CPT | Performed by: PSYCHIATRY & NEUROLOGY

## 2019-06-10 PROCEDURE — 84300 ASSAY OF URINE SODIUM: CPT | Performed by: INTERNAL MEDICINE

## 2019-06-10 PROCEDURE — 92610 EVALUATE SWALLOWING FUNCTION: CPT | Performed by: SPEECH-LANGUAGE PATHOLOGIST

## 2019-06-10 PROCEDURE — 84540 ASSAY OF URINE/UREA-N: CPT | Performed by: INTERNAL MEDICINE

## 2019-06-10 RX ADMIN — FUROSEMIDE 10 MG: 20 TABLET ORAL at 09:59

## 2019-06-10 RX ADMIN — CETIRIZINE HYDROCHLORIDE 10 MG: 10 TABLET, FILM COATED ORAL at 09:59

## 2019-06-10 RX ADMIN — SODIUM CHLORIDE, PRESERVATIVE FREE 3 ML: 5 INJECTION INTRAVENOUS at 20:29

## 2019-06-10 RX ADMIN — PANTOPRAZOLE SODIUM 40 MG: 40 TABLET, DELAYED RELEASE ORAL at 11:10

## 2019-06-10 RX ADMIN — MELOXICAM 7.5 MG: 7.5 TABLET ORAL at 09:59

## 2019-06-10 RX ADMIN — SODIUM CHLORIDE 100 ML/HR: 9 INJECTION, SOLUTION INTRAVENOUS at 03:35

## 2019-06-10 RX ADMIN — CLOPIDOGREL 75 MG: 75 TABLET, FILM COATED ORAL at 11:11

## 2019-06-10 RX ADMIN — LEVOTHYROXINE SODIUM 88 MCG: 88 TABLET ORAL at 09:59

## 2019-06-10 RX ADMIN — ATORVASTATIN CALCIUM 80 MG: 40 TABLET, FILM COATED ORAL at 20:28

## 2019-06-10 RX ADMIN — SODIUM CHLORIDE 100 ML/HR: 9 INJECTION, SOLUTION INTRAVENOUS at 15:46

## 2019-06-10 RX ADMIN — SODIUM CHLORIDE, PRESERVATIVE FREE 3 ML: 5 INJECTION INTRAVENOUS at 08:45

## 2019-06-10 RX ADMIN — FLUOXETINE HYDROCHLORIDE 20 MG: 20 CAPSULE ORAL at 09:59

## 2019-06-10 NOTE — CONSULTS
Nephrology ([facility]) Consult Note      Patient:  Anjelica Gee  YOB: 1950  Date of Service: 6/10/2019  MRN: [id number]   Acct: [id number]   Primary Care Physician: Reynaldo Garcia MD  Advance Directive:   Code Status and Medical Interventions:   Ordered at: 06/09/19 1222     Level Of Support Discussed With:    Patient     Code Status:    CPR     Medical Interventions (Level of Support Prior to Arrest):    Full     Admit Date: 6/9/2019       Hospital Day: 0  Referring Provider: Kiran Domínguez MD      Patient Seen, Chart, Consults, Notes, Labs, Radiology studies reviewed.        Subjective:  Anjelica Gee is a 69 y.o. female  whom we were consulted for acute kidney injury.  Patient has benign hypertension and osteoarthritis.  According to her, she was placed on meloxicam about a year ago.  Patient also claims that she is followed at the renal clinic for chronic kidney disease.  In fact her serum creatinine has been ranging 1.1-1.3.  She is admitted this time with change in status where she was having some slurred speech 2 days ago.  She was initially believed to be having a stroke but the patient later did better.  On June 9, her serum creatinine was 2.5 mg.  Patient is managed with intravenous fluid and renal service was consulted.  She currently denies dysuria, she has no kidney stones.  She has family history of kidney disease.  Her sister has a kidney cancer status post nephrectomy.  Her mother also has kidney disease.  Patient also admitted using other NSAIDs besides meloxicam in her past.    Allergies:  Ace inhibitors; Amlodipine besy-benazepril hcl; Aspirin; B12 folate  [folic acid-vit b6-vit b12]; Codeine; Demerol [meperidine]; Detrol  [tolterodine tartrate]; Latex; Morphine and related; Penicillins; and Sulfa antibiotics    Home Meds:  Medications Prior to Admission   Medication Sig Dispense Refill Last Dose   • carvedilol (COREG) 6.25 MG tablet Take 6.25  mg by mouth 2 times daily (with meals).     Taking   • cetirizine (zyrTEC) 10 MG tablet Take 10 mg by mouth.   Taking   • Cholecalciferol (VITAMIN D) 2000 units capsule Take  by mouth.   Taking   • FLUoxetine (PROzac) 20 MG capsule Take 20 mg by mouth.   Taking   • furosemide (LASIX) 20 MG tablet    Taking   • gemfibrozil (LOPID) 600 MG tablet Take 600 mg by mouth 2 (Two) Times a Day Before Meals.      • levothyroxine (SYNTHROID, LEVOTHROID) 88 MCG tablet Take  by mouth.   Taking   • losartan (COZAAR) 100 MG tablet    Taking   • lovastatin (MEVACOR) 20 MG tablet   5 Taking   • meloxicam (MOBIC) 15 MG tablet   5 Taking   • pantoprazole (PROTONIX) 40 MG EC tablet    Taking   • PARoxetine (PAXIL) 40 MG tablet Take 40 mg by mouth every morning.     Taking   • vitamin C (ASCORBIC ACID) 250 MG tablet Take 250 mg by mouth.   Taking   • acetaminophen (TYLENOL) 325 MG tablet Take 650 mg by mouth.   Taking   • mupirocin (BACTROBAN) 2 % ointment Apply  topically 3 (Three) Times a Day. intranasal 22 g 0 Taking       Medicines:  Current Facility-Administered Medications   Medication Dose Route Frequency Provider Last Rate Last Dose   • acetaminophen (TYLENOL) tablet 650 mg  650 mg Oral Q4H PRN Kiran Domínguez MD       • atorvastatin (LIPITOR) tablet 80 mg  80 mg Oral Nightly Kiran Domínguez MD       • cetirizine (zyrTEC) tablet 10 mg  10 mg Oral Daily Kiran Domínguez MD   10 mg at 06/10/19 0959   • clopidogrel (PLAVIX) tablet 75 mg  75 mg Oral Daily Kiran Domínguez MD   75 mg at 06/10/19 1111   • FLUoxetine (PROzac) capsule 20 mg  20 mg Oral Daily Kiran Domínguez MD   20 mg at 06/10/19 0959   • furosemide (LASIX) tablet 10 mg  10 mg Oral Daily Kiran Domínguez MD   10 mg at 06/10/19 0959   • levothyroxine (SYNTHROID, LEVOTHROID) tablet 88 mcg  88 mcg Oral Q AM Kiran Domínguez MD   88 mcg at 06/10/19 0959   • meloxicam (MOBIC) tablet 7.5 mg  7.5 mg Oral Daily Kiran Domínguez MD   7.5 mg at 06/10/19 0959   •  pantoprazole (PROTONIX) EC tablet 40 mg  40 mg Oral Q AM Kiran Domínguez MD   40 mg at 06/10/19 1110   • sodium chloride 0.9 % flush 10 mL  10 mL Intravenous PRN Khanh Holm MD       • sodium chloride 0.9 % flush 3 mL  3 mL Intravenous Q12H Kiran Domínguez MD   3 mL at 06/10/19 0845   • sodium chloride 0.9 % flush 3-10 mL  3-10 mL Intravenous PRN Kiran Domínguez MD       • sodium chloride 0.9 % infusion  100 mL/hr Intravenous Continuous Kiran Domínguez  mL/hr at 06/10/19 1546 100 mL/hr at 06/10/19 1546       Past Medical History:  Past Medical History:   Diagnosis Date   • Chronic laryngitis 12/4/2017   • Chronic rhinitis 12/4/2017   • Depression    • Goiter 12/4/2017   • High cholesterol    • Hypertension    • Hypothyroidism    • LPRD (laryngopharyngeal reflux disease) 12/4/2017   • Postoperative hypothyroidism 12/4/2017       Past Surgical History:  Past Surgical History:   Procedure Laterality Date   • BLADDER SURGERY      sling   • HYSTERECTOMY     • THYROIDECTOMY, PARTIAL     • THYROIDECTOMY, PARTIAL Left    • TONSILLECTOMY     • TONSILLECTOMY         Family History  Family History   Problem Relation Age of Onset   • No Known Problems Father    • No Known Problems Mother    • Kidney cancer Sister    • Prostate cancer Brother        Social History  Social History     Socioeconomic History   • Marital status:      Spouse name: Not on file   • Number of children: Not on file   • Years of education: Not on file   • Highest education level: Not on file   Tobacco Use   • Smoking status: Never Smoker   • Smokeless tobacco: Never Used   Substance and Sexual Activity   • Alcohol use: No   • Drug use: No         Review of Systems:  History obtained from chart review and the patient  General ROS: No fever but chills  Respiratory ROS: No cough, shortness of breath, wheezing  Cardiovascular ROS: no chest pain or dyspnea on exertion  Gastrointestinal ROS: No abdominal pain or  "melena  Genito-Urinary ROS: No dysuria or hematuria  14 point ROS reviewed with the patient and negative except as noted above and in the HPI unless unable to obtain.    Objective:  /57 (BP Location: Left arm, Patient Position: Lying)   Pulse 70   Temp 97.9 °F (36.6 °C) (Oral)   Resp 20   Ht 177.8 cm (70\")   Wt 86.5 kg (190 lb 11.2 oz)   SpO2 97%   BMI 27.36 kg/m²     Intake/Output Summary (Last 24 hours) at 6/10/2019 1733  Last data filed at 6/10/2019 1449  Gross per 24 hour   Intake 1052 ml   Output 1950 ml   Net -898 ml     General: awake/alert    Head: Atraumatic, most cephalic  Neck: No jugular venous distention, no palpable masses  Chest:  clear to auscultation bilaterally without respiratory distress   CVS: regular rate and rhythm  Abdominal: soft, nontender, normal bowel sounds  Extremities: no cyanosis or edema  Skin: warm and dry without rash  Neuro: No focal motor deficits  Musculoskeletal: No obvious joint effusions    Labs:  Lab Results (last 72 hours)     Procedure Component Value Units Date/Time    POC Glucose Once [[id number]]  (Abnormal) Collected:  06/10/19 1314    Specimen:  Blood Updated:  06/10/19 1325     Glucose 170 mg/dL      Comment: : [id number] Ronald MeganMeter ID: [id number]       Basic Metabolic Panel [[id number]]  (Abnormal) Collected:  06/10/19 0436    Specimen:  Blood Updated:  06/10/19 0941     Glucose 83 mg/dL      BUN 30 mg/dL      Creatinine 1.70 mg/dL      Sodium 141 mmol/L      Potassium 3.8 mmol/L      Chloride 112 mmol/L      CO2 19.0 mmol/L      Calcium 8.5 mg/dL      eGFR Non African Amer 30 mL/min/1.73      BUN/Creatinine Ratio 17.6     Anion Gap 10.0 mmol/L     Narrative:       GFR Normal >60  Chronic Kidney Disease <60  Kidney Failure <15    CBC & Differential [[id number]] Collected:  06/10/19 0436    Specimen:  Blood Updated:  06/10/19 0936    Narrative:       The following orders were created for panel order CBC & Differential.  Procedure                 "               Abnormality         Status                     ---------                               -----------         ------                     CBC Auto Differential[[id number]]        Abnormal            Final result                 Please view results for these tests on the individual orders.    CBC Auto Differential [[id number]]  (Abnormal) Collected:  06/10/19 0436    Specimen:  Blood Updated:  06/10/19 0936     WBC 4.98 10*3/mm3      RBC 3.41 10*6/mm3      Hemoglobin 9.7 g/dL      Hematocrit 28.7 %      MCV 84.2 fL      MCH 28.4 pg      MCHC 33.8 g/dL      RDW 12.4 %      RDW-SD 37.9 fl      MPV 11.3 fL      Platelets 179 10*3/mm3      Neutrophil % 59.3 %      Lymphocyte % 27.7 %      Monocyte % 9.8 %      Eosinophil % 2.6 %      Basophil % 0.2 %      Immature Grans % 0.4 %      Neutrophils, Absolute 2.95 10*3/mm3      Lymphocytes, Absolute 1.38 10*3/mm3      Monocytes, Absolute 0.49 10*3/mm3      Eosinophils, Absolute 0.13 10*3/mm3      Basophils, Absolute 0.01 10*3/mm3      Immature Grans, Absolute 0.02 10*3/mm3      nRBC 0.0 /100 WBC     Hemoglobin A1c [[id number]] Collected:  06/10/19 0436    Specimen:  Blood Updated:  06/10/19 0729     Hemoglobin A1C 5.6 %     Narrative:       Less than 6.0           Non-Diabetic Range  6.0-7.0                 ADA Therapeutic Target  Greater than 7.0        Action Suggested    Lipid Panel [[id number]]  (Abnormal) Collected:  06/10/19 0436    Specimen:  Blood Updated:  06/10/19 0546     Total Cholesterol 112 mg/dL      Triglycerides 216 mg/dL      HDL Cholesterol 14 mg/dL      LDL Cholesterol  63 mg/dL      LDL/HDL Ratio 3.91    POC Glucose Once [[id number]]  (Normal) Collected:  06/10/19 0009    Specimen:  Blood Updated:  06/10/19 0027     Glucose 85 mg/dL      Comment: : [id number] Marylou ButlerMeter ID: [id number]       Urinalysis With Culture If Indicated - Urine, Clean Catch [[id number]]  (Normal) Collected:  06/09/19 8514    Specimen:  Urine, Clean Catch Updated:   06/10/19 0006     Color, UA Yellow     Appearance, UA Clear     pH, UA <=5.0     Specific Gravity, UA 1.014     Glucose, UA Negative     Ketones, UA Negative     Bilirubin, UA Negative     Blood, UA Negative     Protein, UA Negative     Leuk Esterase, UA Negative     Nitrite, UA Negative     Urobilinogen, UA 0.2 E.U./dL    Narrative:       Urine microscopic not indicated.    [place] Draw [[id number]] Collected:  06/09/19 1151    Specimen:  Blood Updated:  06/09/19 1301    Narrative:       The following orders were created for panel order [place] Draw.  Procedure                               Abnormality         Status                     ---------                               -----------         ------                     Light Blue Top[[id number]]                                   Final result               Green Top (Gel)[[id number]]                                  Final result               Lavender Top[[id number]]                                     Final result               Red Top[[id number]]                                          Final result                 Please view results for these tests on the individual orders.    Light Blue Top [[id number]] Collected:  06/09/19 1151    Specimen:  Blood Updated:  06/09/19 1301     Extra Tube hold for add-on     Comment: Auto resulted       Green Top (Gel) [[id number]] Collected:  06/09/19 1151    Specimen:  Blood Updated:  06/09/19 1301     Extra Tube Hold for add-ons.     Comment: Auto resulted.       Lavender Top [[id number]] Collected:  06/09/19 1151    Specimen:  Blood Updated:  06/09/19 1301     Extra Tube hold for add-on     Comment: Auto resulted       Red Top [[id number]] Collected:  06/09/19 1151    Specimen:  Blood Updated:  06/09/19 1301     Extra Tube Hold for add-ons.     Comment: Auto resulted.       Troponin [[id number]]  (Normal) Collected:  06/09/19 1151    Specimen:  Blood Updated:  06/09/19 1225     Troponin I <0.012 ng/mL     CBC & Differential  [[id number]] Collected:  06/09/19 1151    Specimen:  Blood Updated:  06/09/19 1224    Narrative:       The following orders were created for panel order CBC & Differential.  Procedure                               Abnormality         Status                     ---------                               -----------         ------                     CBC Auto Differential[[id number]]        Abnormal            Final result                 Please view results for these tests on the individual orders.    CBC Auto Differential [[id number]]  (Abnormal) Collected:  06/09/19 1151    Specimen:  Blood Updated:  06/09/19 1224     WBC 6.85 10*3/mm3      RBC 3.84 10*6/mm3      Hemoglobin 11.0 g/dL      Hematocrit 32.3 %      MCV 84.1 fL      MCH 28.6 pg      MCHC 34.1 g/dL      RDW 12.2 %      RDW-SD 37.3 fl      MPV 10.7 fL      Platelets 209 10*3/mm3     Narrative:       The previously reported component NRBC is no longer being reported.    Manual Differential [[id number]]  (Abnormal) Collected:  06/09/19 1151    Specimen:  Blood Updated:  06/09/19 1224     Neutrophil % 67.0 %      Lymphocyte % 13.0 %      Monocyte % 12.0 %      Eosinophil % 3.0 %      Bands %  3.0 %      Atypical Lymphocyte % 2.0 %      Neutrophils Absolute 4.80 10*3/mm3      Lymphocytes Absolute 0.89 10*3/mm3      Monocytes Absolute 0.82 10*3/mm3      Eosinophils Absolute 0.21 10*3/mm3      RBC Morphology Normal     WBC Morphology Normal     Platelet Morphology Normal    Comprehensive Metabolic Panel [[id number]]  (Abnormal) Collected:  06/09/19 1151    Specimen:  Blood Updated:  06/09/19 1212     Glucose 132 mg/dL      BUN 33 mg/dL      Creatinine 2.56 mg/dL      Sodium 139 mmol/L      Potassium 3.7 mmol/L      Chloride 105 mmol/L      CO2 20.0 mmol/L      Calcium 9.1 mg/dL      Total Protein 7.6 g/dL      Albumin 3.90 g/dL      ALT (SGPT) <15 U/L      AST (SGOT) 30 U/L      Alkaline Phosphatase 120 U/L      Total Bilirubin 0.3 mg/dL      eGFR Non  Amer 19  mL/min/1.73      Globulin 3.7 gm/dL      A/G Ratio 1.1 g/dL      BUN/Creatinine Ratio 12.9     Anion Gap 14.0 mmol/L     Narrative:       GFR Normal >60  Chronic Kidney Disease <60  Kidney Failure <15    Protime-INR [[id number]]  (Abnormal) Collected:  06/09/19 1151    Specimen:  Blood Updated:  06/09/19 1211     Protime 19.2 Seconds      INR 1.56    aPTT [[id number]]  (Abnormal) Collected:  06/09/19 1151    Specimen:  Blood Updated:  06/09/19 1211     PTT 59.4 seconds     POC Glucose Once [[id number]]  (Abnormal) Collected:  06/09/19 1132    Specimen:  Blood Updated:  06/09/19 1145     Glucose 147 mg/dL      Comment: : [id number] Chandler PereaMeter ID: [id number]             Radiology:   Imaging Results (last 72 hours)     Procedure Component Value Units Date/Time    MRI Brain With & Without Contrast [[id number]] Collected:  06/09/19 1700     Updated:  06/09/19 1711    Narrative:       EXAM: MRI BRAIN W WO CONTRAST- - 6/9/2019 4:09 PM CDT     HISTORY: Stroke. Technologist obtained history indicates headaches,  difficulty with speech.     COMPARISON: 06/09/2019.      TECHNIQUE: Routine protocol MRI performed of the brain without and with  intravenous contrast.     FINDINGS:  No acute intracranial hemorrhage, midline shift, mass effect, or  restricted diffusion to indicate acute infarct. No abnormal enhancement.  Moderate confluent T2/FLAIR hyperintensity in the periventricular and  subcortical white matter, nonspecific, but most commonly chronic  microvascular changes. Ventricles, cisterns and sulci are normal in size  and configuration. Sella and midline structures within normal limits.  Brainstem and posterior fossa are within normal limits.      Visualized contrasted vessels and noncontrasted flow voids are within  normal limits. Thinning of the ocular lenses may be age-related or  postoperative. Visualized retrobulbar soft tissue, paranasal sinuses,  and mastoid air cells are within normal limits. Visualized  overlying  soft tissues within normal limits.          Impression:       1. No acute intracranial abnormality. No evidence of intracranial  hemorrhage, mass or acute infarction.  2. Moderate chronic microvascular changes.  3. Normal aeration of the paranasal sinuses and mastoid air cells.     This report was finalized on 06/09/2019 17:08 by Dr Lizbeth Heard MD.    XR Chest 1 View [[id number]] Collected:  06/09/19 1223     Updated:  06/09/19 1227    Narrative:       EXAM: XR CHEST 1 VW- - 6/9/2019 12:21 PM CDT     HISTORY: Acute Stroke Protocol (Onset < 12 hrs)       COMPARISON: 03/18/2016.      TECHNIQUE:  1 images.  Frontal view of the chest.     FINDINGS:    Right upper lobe opacity. No pneumothorax or pleural effusion. Cardiac  and mediastinal silhouette within normal limits. No acute bony finding.          Impression:       1. Right upper lobe opacity. In the appropriate clinical setting, this  could be seen with pneumonia. Recommend follow-up after treatment to  evaluate for resolution.  This report was finalized on 06/09/2019 12:24 by Dr Lizbeth Heard MD.    CT Head Without Contrast Stroke Protocol [[id number]] Collected:  06/09/19 1153     Updated:  06/09/19 1158    Narrative:       EXAM: CT HEAD WO CONTRAST STROKE PROTOCOL- - 6/9/2019 11:40 AM CDT     HISTORY: Stroke. Slurred speech.     COMPARISON: None.      DOSE LENGTH PRODUCT: 673 mGy cm. Automated exposure control was also  utilized to decrease patient radiation dose.     TECHNIQUE: Unenhanced axial CT images obtained from vertex to skull  base.     FINDINGS:  No acute intracranial hemorrhage, midline shift, mass effect or large  territory cortical infarct. Ventricles, sulci, basilar cisterns are  normal in size and configuration for the patient's age. Moderate  periventricular and subcortical white matter hypodensities, most likely  due to chronic microvascular disease.     Thinning of the ocular lenses may be age-related or  postoperative.  Visualized retrobulbar soft tissue within normal limits. Paranasal  sinuses, mastoid air cells and external auditory canals are within  normal limits. Calvarium appears intact. Subcutaneous tissues within  normal limits.          Impression:       1. No acute intracranial findings.  2. Probable moderate chronic microvascular disease.     Results communicated by telephone to Dr. Khanh Hogan at 11:54 AM on  06/09/2019.  This report was finalized on 06/09/2019 11:55 by Dr Lizbeth Heard MD.          Culture:  No components found for: WOUNDCUL, 3  No components found for: CSFCUL, 3  No components found for: BC, 3  No components found for: URINECUL, 3      Assessment   -Acute kidney injury due to prerenal azotemia versus acute tubular necrosis  -Chronic kidney disease stage III due to chronic NSAIDs use  -Benign hypertension  -Transient ischemic attack  -Hypocarbia    Plan:  I agree with saline infusion.  We will continue to monitor the labs.  We will get urine electrolytes and renal ultrasound.  We will also get an arterial blood gas to further understand her acid-base disorders.  Patient was counseled about the nephrotoxicity of NSAIDs and was advised to avoid them in the future.  Will check serum uric acid level and PTH level.      Thank you for the consult, we appreciate the opportunity to provide care to your patients.  Feel free to contact me if I can be of any further assistance.      Bethel Garza MD  6/10/2019  5:33 PM

## 2019-06-10 NOTE — PLAN OF CARE
Problem: Patient Care Overview  Goal: Plan of Care Review  Outcome: Ongoing (interventions implemented as appropriate)   06/10/19 4543   Coping/Psychosocial   Plan of Care Reviewed With patient;other (see comments);son  (ABILIO Sharon )   Plan of Care Review   Progress no change  (Initial Evaluation )   OTHER   Outcome Summary Clinical bedside swallow evaluation completed. Patient was alert and cooperative with son present. She was sitting upright in bedside chair. Previous VFSS completed 11-6-17 with dysphagia noted. She tells me that she has not completed swallowing therapy and never modified her diet. Oral motor assessment was unremarkable. CXR with right upper lobe opacities. Patient completed a full range of consistencies except mechanical soft. No overt s/s were observed given liquid trials. Functional rotary chew given regular solid trial. SLP unable to fully rule out aspiration at bedside. SLP will follow up for diet tolerance. SLP recommends: 1) Regular diet 2) Thin liquids 3) Meds whole with thin liquids 4) Oral care. SLP also ordered for speech language evaluation, patient has returned to baseline. Full evaluation is not warranted at this time.

## 2019-06-10 NOTE — NURSING NOTE
Pt released from NPO status. Son brought home meds last night (pt/son thought they would use home meds to avoid a pharmacy bill) and I explained yesterday that I would have the med rec tech to come up to put barcodes on the bottles to be scanned but not to take any of them unless we were aware and could scan them (after released from NPO). I called pharmacy and said there was no bill to use our meds. Upon entering her room to give her the morning medications, she informed me that she had already taken them. Charted morning meds as NOT given.

## 2019-06-10 NOTE — PLAN OF CARE
Problem: Patient Care Overview  Goal: Plan of Care Review  Outcome: Ongoing (interventions implemented as appropriate)   06/10/19 4356   Coping/Psychosocial   Plan of Care Reviewed With patient   Plan of Care Review   Progress improving   OTHER   Outcome Summary Up with stand by assist to bathroom. Does well. Speech clear. NIH 0. NPO till speech eval. Tele on. Refuses SCD's, stated they better not charge me for those. Will cont to monitor.        Problem: Skin Injury Risk (Adult)  Goal: Identify Related Risk Factors and Signs and Symptoms  Outcome: Ongoing (interventions implemented as appropriate)    Goal: Skin Health and Integrity  Outcome: Ongoing (interventions implemented as appropriate)      Problem: Fall Risk (Adult)  Goal: Identify Related Risk Factors and Signs and Symptoms  Outcome: Outcome(s) achieved Date Met: 06/10/19    Goal: Absence of Fall  Outcome: Ongoing (interventions implemented as appropriate)

## 2019-06-10 NOTE — THERAPY DISCHARGE NOTE
Acute Care - Physical Therapy Initial Eval/Discharge  [facility]     Patient Name: Anjelica Gee  : 1950  MRN: [id number]  Today's Date: 6/10/2019   Onset of Illness/Injury or Date of Surgery: 19  Date of Referral to PT: 19  Referring Physician: Dr. Domínguez      Admit Date: 2019    Visit Dx:    ICD-10-CM ICD-9-CM   1. Encephalopathy G93.40 348.30     Patient Active Problem List   Diagnosis   • Pharyngoesophageal dysphagia   • LPRD (laryngopharyngeal reflux disease)   • Chronic laryngitis   • Postoperative hypothyroidism   • Goiter   • Chronic rhinitis   • Encephalopathy     Past Medical History:   Diagnosis Date   • Chronic laryngitis 2017   • Chronic rhinitis 2017   • Depression    • Goiter 2017   • High cholesterol    • Hypertension    • Hypothyroidism    • LPRD (laryngopharyngeal reflux disease) 2017   • Postoperative hypothyroidism 2017     Past Surgical History:   Procedure Laterality Date   • BLADDER SURGERY      sling   • HYSTERECTOMY     • THYROIDECTOMY, PARTIAL     • THYROIDECTOMY, PARTIAL Left    • TONSILLECTOMY     • TONSILLECTOMY            PT ASSESSMENT (last 12 hours)      Physical Therapy Evaluation     Row Name 06/10/19 0752          PT Evaluation Time/Intention    Subjective Information  no complaints  -MS     Document Type  discharge evaluation/summary  -MS     Row Name 06/10/19 0752          General Information    Patient Profile Reviewed?  yes  -MS     Onset of Illness/Injury or Date of Surgery  19  -MS     Referring Physician  Dr. Domínguez  -MS     Patient Observations  alert;cooperative;agree to therapy  -MS     Patient/Family Observations  son present  -MS     General Observations of Patient  pt fowlers in bed, awake and in no apparent distress  -MS     Prior Level of Function  independent:;all household mobility;community mobility;ADL's;shopping;driving;work  -MS     Pertinent History of Current Functional Problem  c/o malaise and  fullness in her head, slurred speech  -MS     Existing Precautions/Restrictions  no known precautions/restrictions  -MS     Risks Reviewed  patient:;LOB;nausea/vomiting;dizziness;increased discomfort  -MS     Benefits Reviewed  patient:;improve function;increase knowledge  -MS     Barriers to Rehab  none identified  -MS     Row Name 06/10/19 0752          Relationship/Environment    Primary Source of Support/Comfort  child(roberto) son  -MS     Row Name 06/10/19 0752          Home Main Entrance    Number of Stairs, Main Entrance  three  -MS     Stair Railings, Main Entrance  none  -MS     Row Name 06/10/19 0752          Cognitive Assessment/Interventions    Additional Documentation  Cognitive Assessment/Intervention (Group)  -MS     Row Name 06/10/19 0752          Cognitive Assessment/Intervention- PT/OT    Affect/Mental Status (Cognitive)  WNL  -MS     Orientation Status (Cognition)  oriented x 4  -MS     Follows Commands (Cognition)  WNL  -MS     Cognitive Function (Cognitive)  memory deficit  -MS     Memory Deficit (Cognitive)  -- 5 min recall 2/3, immediated recall 3/3  -MS     Personal Safety Interventions  fall prevention program maintained;gait belt;nonskid shoes/slippers when out of bed;supervised activity  -MS     Row Name 06/10/19 0752          Bed Mobility Assessment/Treatment    Bed Mobility Assessment/Treatment  supine-sit  -MS     Supine-Sit [place] (Bed Mobility)  independent  -MS     Row Name 06/10/19 0752          Transfer Assessment/Treatment    Transfer Assessment/Treatment  sit-stand transfer;stand-sit transfer  -MS     Sit-Stand [place] (Transfers)  independent  -MS     Stand-Sit [place] (Transfers)  independent  -MS     Row Name 06/10/19 0752          Gait/Stairs Assessment/Training    [place] Level (Gait)  independent  -MS     Distance in Feet (Gait)  200ft, pt able to walk forward, backward, side to side, over 3 in objects, and around objects  -MS     Row Name 06/10/19 0752           General ROM    GENERAL ROM COMMENTS  all extremities WNL  -MS     Row Name 06/10/19 0752          MMT (Manual Muscle Testing)    General MMT Comments  5/5 throughout  -MS     Row Name 06/10/19 [facility]          Motor Assessment/Intervention    Additional Documentation  Balance (Group);Fine Motor Testing & Training (Group);Gross Motor Coordination (Group)  -MS     Row Name 06/10/19 0752          Gross Motor Coordination    Gross Motor Skill, Impairments Detail  B UEs and B LEs intact for accuracy and MAYTE. finger to nose and heel to shin  -MS     Row Name 06/10/19 [facility]          Balance    Balance  static standing balance  -MS     Row Name 06/10/19 [facility]          Static Standing Balance    Level of [place] (Unsupported Standing, Static Balance)  independent  -MS     Comment (Unsupported Standing, Static Balance)  pt able to stand tandem and standin feet together with EC  -MS     Row Name 06/10/19 [facility]          Fine Motor Testing & Training    Comment, Fine Motor Coordination  B hands opposition  -MS     Row Name 06/10/19 [facility]          Sensory Assessment/Intervention    Sensory General Assessment  no sensation deficits identified  -MS     Row Name 06/10/19 [facility]          Vision Assessment/Intervention    Vision Assessment Comment  tracking intact in all directions  -MS     Row Name 06/10/19 [facility]          Pain Assessment    Additional Documentation  Pain Scale: Numbers Pre/Post-Treatment (Group)  -MS     Row Name 06/10/19 0752          Pain Scale: Numbers Pre/Post-Treatment    Pain Scale: Numbers, Pretreatment  0/10 - no pain  -MS     Pain Scale: Numbers, Post-Treatment  0/10 - no pain  -MS     Row Name 06/10/19 [facility]          Plan of Care Review    Plan of Care Reviewed With  patient  -MS     Row Name 06/10/19 [facility]          Physical Therapy Clinical Impression    Date of Referral to PT  06/09/19  -MS     Criteria for Skilled Interventions Met (PT Clinical Impression)  no problems identified which require skilled  intervention  -MS     Care Plan Review (PT)  evaluation/treatment results reviewed  -MS     Row Name 06/10/19 0752          Positioning and Restraints    Post Treatment Position  chair  -MS     In Chair  sitting;call light within reach;encouraged to call for assist;with family/caregiver  -MS     Row Name 06/10/19 0752          Physical Therapy Discharge Summary    Additional Documentation  Discharge Summary, PT Eval (Group)  -MS     Row Name 06/10/19 0752          Discharge Summary, PT Eval    Reason for Discharge (PT Discharge Summary)  no further needs identified  -MS     Outcomes Achieved Upon Discharge (PT Discharge Summary)  evaluation only  -MS     Row Name 06/10/19 0752          Living Environment    Home Accessibility  stairs to enter home;tub/shower is not walk in  -MS       User Key  (r) = Recorded By, (t) = Taken By, (c) = Cosigned By    Initials Name Provider Type    MS Mik Franny R, PT, DPT, NCS Physical Therapist              PT Recommendation and Plan  Anticipated Discharge Disposition (PT): home with assist  Therapy Frequency (PT Clinical Impression): evaluation only  Outcome Summary/Treatment Plan (PT)  Anticipated Discharge Disposition (PT): home with assist  Reason for Discharge (PT Discharge Summary): no further needs identified  Plan of Care Reviewed With: patient  Progress: improving  Outcome Summary: PT evaluation completed. The patient is at her functional baseline with no focal neurological deficits in her mobility. She does demonstrate a slightlly speech difficulty with forming words at times and minimal difficulty with memory recall. Pt has no further needs for PT at this time. Recommend home at discharge.     Outcome Measures     Row Name 06/10/19 0752             How much help from another person do you currently need...    Turning from your back to your side while in flat bed without using bedrails?  4  -MS      Moving from lying on back to sitting on the side of a flat bed without  bedrails?  4  -MS      Moving to and from a bed to a chair (including a wheelchair)?  4  -MS      Standing up from a chair using your arms (e.g., wheelchair, bedside chair)?  4  -MS      Climbing 3-5 steps with a railing?  4  -MS      To walk in hospital room?  4  -MS      AM-PAC 6 Clicks Score  24  -MS         Functional Assessment    Outcome Measure Options  AM-PAC 6 Clicks Basic Mobility (PT)  -MS        User Key  (r) = Recorded By, (t) = Taken By, (c) = Cosigned By    Initials Name Provider Type    MS Franny Houser, PT, DPT, NCS Physical Therapist           Time Calculation:   PT Charges     Row Name 06/10/19 0832             Time Calculation    Start Time  0740  -MS      Stop Time  0818  -MS      Time Calculation (min)  38 min  -MS      PT Received On  06/10/19  -MS        User Key  (r) = Recorded By, (t) = Taken By, (c) = Cosigned By    Initials Name Provider Type    MS Franny Houser, PT, DPT, NCS Physical Therapist        Therapy Charges for Today     Code Description Service Date Service Provider Modifiers Qty    23862752183 HC PT EVAL LOW COMPLEXITY 3 6/10/2019 Franny Houser PT, DPT, NCS GP 1          PT G-Codes  Outcome Measure Options: AM-PAC 6 Clicks Basic Mobility (PT)  AM-PAC 6 Clicks Score: 24    PT Discharge Summary  Anticipated Discharge Disposition (PT): home with assist    Franny Houser PT, DPMAYO, MISTY  6/10/2019

## 2019-06-10 NOTE — THERAPY DISCHARGE NOTE
Acute Care - Occupational Therapy Initial Eval/Discharge  [facility]     Patient Name: Anjelica Gee  : 1950  MRN: [id number]  Today's Date: 6/10/2019  Onset of Illness/Injury or Date of Surgery: 19  Date of Referral to OT: 19  Referring Physician: Dr. Domínguez      Admit Date: 2019       ICD-10-CM ICD-9-CM   1. Dysphagia, unspecified type R13.10 787.20   2. Encephalopathy G93.40 348.30     Patient Active Problem List   Diagnosis   • Pharyngoesophageal dysphagia   • LPRD (laryngopharyngeal reflux disease)   • Chronic laryngitis   • Postoperative hypothyroidism   • Goiter   • Chronic rhinitis   • Encephalopathy     Past Medical History:   Diagnosis Date   • Chronic laryngitis 2017   • Chronic rhinitis 2017   • Depression    • Goiter 2017   • High cholesterol    • Hypertension    • Hypothyroidism    • LPRD (laryngopharyngeal reflux disease) 2017   • Postoperative hypothyroidism 2017     Past Surgical History:   Procedure Laterality Date   • BLADDER SURGERY      sling   • HYSTERECTOMY     • THYROIDECTOMY, PARTIAL     • THYROIDECTOMY, PARTIAL Left    • TONSILLECTOMY     • TONSILLECTOMY            OT ASSESSMENT FLOWSHEET (last 12 hours)      Occupational Therapy Evaluation     Row Name 06/10/19 1440                   OT Evaluation Time/Intention    Subjective Information  no complaints  -        Document Type  evaluation  -        Mode of Treatment  occupational therapy  -           General Information    Patient Profile Reviewed?  yes  -        Onset of Illness/Injury or Date of Surgery  19  -        Referring Physician  Dr. Doímnguez  -        Patient Observations  alert;cooperative;agree to therapy  -        Patient/Family Observations  son present  -        Prior Level of Function  independent:;all household mobility;community mobility;ADL's  -        Pertinent History of Current Functional Problem  Abnormal speech  -        Existing  Precautions/Restrictions  no known precautions/restrictions  -        Risks Reviewed  patient and family:;LOB;increased discomfort;dizziness  -        Benefits Reviewed  patient and family:;improve function;increase independence;increase strength;increase balance  -           Relationship/Environment    Primary Source of Support/Comfort  child(roberto)  -[facility]        Lives With  child(roberto), adult  -           Resource/Environmental Concerns    Current Living Arrangements  home/apartment/condo  -           Home Main Entrance    Number of Stairs, Main Entrance  three  -CH           Cognitive Assessment/Intervention- PT/OT    Affect/Mental Status (Cognitive)  WNL  -CH        Orientation Status (Cognition)  oriented x 4  -CH        Follows Commands (Cognition)  WNL  -CH        Personal Safety Interventions  fall prevention program maintained;gait belt;muscle strengthening facilitated;nonskid shoes/slippers when out of bed;supervised activity  -           Bed Mobility Assessment/Treatment    Bed Mobility Assessment/Treatment  supine-sit  -        Supine-Sit [place] (Bed Mobility)  independent  -           Functional Mobility    Functional Mobility- Ind. Level  independent  -           Sit-Stand Transfer    Sit-Stand [place] (Transfers)  independent  -           Stand-Sit Transfer    Stand-Sit [place] (Transfers)  independent  -           ADL Assessment/Intervention    BADL Assessment/Intervention  lower body dressing  -           Lower Body Dressing Assessment/Training    Lower Body Dressing [place] Level  independent;don;socks  -        Lower Body Dressing Position  edge of bed sitting  -           General ROM    GENERAL ROM COMMENTS  all extremities WNL  -CH           MMT (Manual Muscle Testing)    General MMT Comments  4+/5  -CH           Motor Assessment/Interventions    Additional Documentation  Balance (Group);Fine Motor Testing & Training (Group);Gross Motor Coordination  (Group);Writing Assessment/Intervention (Group)  -           Gross Motor Coordination    Gross Motor Skill, Impairments Detail  BUEs intact   -           Balance    Balance  static sitting balance;static standing balance  -           Static Sitting Balance    Level of [place] (Unsupported Sitting, Static Balance)  independent  -           Static Standing Balance    Level of [place] (Supported Standing, Static Balance)  independent  -           Fine Motor Testing & Training    Fine Motor Tests  other (see comments)  -        Training Activity, Fine Motor Coordination  opening/closing containers;manipulation of eating utensils  -        Comment, Fine Motor Coordination  WNL  -           Writing Assessment/Intervention    Skills Assessment (Writing)  WNL  -        Copying Ability (Writing)  WNL  -        Writing to Dictation (Writing)  WNL  -           Sensory Assessment/Intervention    Sensory General Assessment  no sensation deficits identified  -        Additional Documentation  Vision Assessment/Intervention (Group)  -           Positioning and Restraints    Pre-Treatment Position  in bed  -        Post Treatment Position  bed  -        In Bed  sitting EOB;with family/caregiver  -           Plan of Care Review    Plan of Care Reviewed With  patient;son  -           Clinical Impression (OT)    Date of Referral to OT  06/09/19  [facility]        Criteria for Skilled Therapeutic Interventions Met (OT Eval)  no;treatment indicated  -        Therapy Frequency (OT Eval)  evaluation only  -        Care Plan Review (OT)  evaluation/treatment results reviewed;care plan/treatment goals reviewed;risks/benefits reviewed;current/potential barriers reviewed;patient/other agree to care plan  -        Anticipated Discharge Disposition (OT)  home with assist  -           Discharge Summary (Occupational Therapy)    Additional Documentation  Discharge Summary, OT Eval (Group)  -            Discharge Summary, OT Eval    Reason for Discharge (OT Discharge Summary)  no further needs identified  -           Living Environment    Home Accessibility  stairs to enter home;tub/shower is not walk in  -          User Key  (r) = Recorded By, (t) = Taken By, (c) = Cosigned By    Initials Name Effective Dates    [facility] Annie Slaughter, OTR/L 08/02/16 -               OT Recommendation and Plan  Outcome Summary/Treatment Plan (OT)  Anticipated Discharge Disposition (OT): home with assist  Reason for Discharge (OT Discharge Summary): no further needs identified  Therapy Frequency (OT Eval): evaluation only  Plan of Care Review  Plan of Care Reviewed With: patient  Plan of Care Reviewed With: patient  Outcome Summary: OT eval completed.  Pt. performs ADLs, fxl mobility, t/fs, pencil/paper tasks I'ly.  She has no focal neurological deficits & reports she is at baseline.  No further OT tx needed at this time.  Anticipate DC with son home     Rehab Goal Summary     Row Name 06/10/19 0810             Swallow Goals (SLP)    Oral Nutrition/Hydration Goal Selection (SLP)  oral nutrition/hydration, SLP goal 1  -MM         Oral Nutrition/Hydration Goal 1 (SLP)    Oral Nutrition/Hydration Goal 1, SLP  LTG: Patient will tolerate LRD without s/s of aspiration.  -MM      Time Frame (Oral Nutrition/Hydration Goal 1, SLP)  by discharge  -MM      Barriers (Oral Nutrition/Hydration Goal 1, SLP)  n/a  -MM      Progress/Outcomes (Oral Nutrition/Hydration Goal 1, SLP)  goal ongoing  -MM        User Key  (r) = Recorded By, (t) = Taken By, (c) = Cosigned By    Initials Name Provider Type Discipline    Franny Reynolds MS CCC-SLP Speech and Language Pathologist SLP          Outcome Measures     Row Name 06/10/19 1500 06/10/19 0752          How much help from another person do you currently need...    Turning from your back to your side while in flat bed without using bedrails?  --  4  -MS     Moving from lying on back to sitting on  the side of a flat bed without bedrails?  --  4  -MS     Moving to and from a bed to a chair (including a wheelchair)?  --  4  -MS     Standing up from a chair using your arms (e.g., wheelchair, bedside chair)?  --  4  -MS     Climbing 3-5 steps with a railing?  --  4  -MS     To walk in hospital room?  --  4  -MS     AM-PAC 6 Clicks Score  --  24  -MS        How much help from another is currently needed...    Putting on and taking off regular lower body clothing?  4  -CH  --     Bathing (including washing, rinsing, and drying)  4  -CH  --     Toileting (which includes using toilet bed pan or urinal)  4  -CH  --     Putting on and taking off regular upper body clothing  4  -CH  --     Taking care of personal grooming (such as brushing teeth)  4  -CH  --     Eating meals  4  -CH  --     Score  24  -CH  --        Functional Assessment    Outcome Measure Options  AM-PAC 6 Clicks Daily Activity (OT)  -  AM-PAC 6 Clicks Basic Mobility (PT)  -MS       User Key  (r) = Recorded By, (t) = Taken By, (c) = Cosigned By    Initials Name Provider Type     Annie Slaughter OTR/L Occupational Therapist    MS Franny Houser, PT, DPT, NCS Physical Therapist          Time Calculation:   Time Calculation- OT     Row Name 06/10/19 1440             Time Calculation- OT    OT Start Time  1440  -      OT Stop Time  1535  -      OT Time Calculation (min)  55 min  -      OT Received On  06/10/19  -        User Key  (r) = Recorded By, (t) = Taken By, (c) = Cosigned By    Initials Name Provider Type     Annie Slaughter OTR/L Occupational Therapist        Therapy Suggested Charges     Code   Minutes Charges    None           Therapy Charges for Today     Code Description Service Date Service Provider Modifiers Qty    [id number]  OT EVAL LOW COMPLEXITY 4 6/10/2019 Annie Slaughter OTR/L GO 1               OT Discharge Summary  Anticipated Discharge Disposition (OT): home with assist    LYSSA Cruz/IRVIN  6/10/2019

## 2019-06-10 NOTE — PROGRESS NOTES
Neurology Progress Note      Chief Complaint:  malaise    Subjective     Subjective:    Feels back to baseline today and wants to go home.  She does not recall the antibiotic given to her by Mustapha LOMAX but knows that it was not Bactrim      Medications:  Current Facility-Administered Medications   Medication Dose Route Frequency Provider Last Rate Last Dose   • acetaminophen (TYLENOL) tablet 650 mg  650 mg Oral Q4H PRN Kiran Domínguez MD       • atorvastatin (LIPITOR) tablet 80 mg  80 mg Oral Nightly Kiran Domínguez MD       • cetirizine (zyrTEC) tablet 10 mg  10 mg Oral Daily Kiran Domínguez MD   10 mg at 06/10/19 0959   • clopidogrel (PLAVIX) tablet 75 mg  75 mg Oral Daily Kiran Domínguez MD   75 mg at 06/10/19 1111   • FLUoxetine (PROzac) capsule 20 mg  20 mg Oral Daily Kiran Domínguez MD   20 mg at 06/10/19 0959   • furosemide (LASIX) tablet 10 mg  10 mg Oral Daily Kiran Domínguez MD   10 mg at 06/10/19 0959   • levothyroxine (SYNTHROID, LEVOTHROID) tablet 88 mcg  88 mcg Oral Q AM Kiran Domínguez MD   88 mcg at 06/10/19 0959   • meloxicam (MOBIC) tablet 7.5 mg  7.5 mg Oral Daily Kiran Domínguez MD   7.5 mg at 06/10/19 0959   • pantoprazole (PROTONIX) EC tablet 40 mg  40 mg Oral Q AM Kiran Domínguez MD   40 mg at 06/10/19 1110   • sodium chloride 0.9 % flush 10 mL  10 mL Intravenous PRN Khanh Holm MD       • sodium chloride 0.9 % flush 3 mL  3 mL Intravenous Q12H Kiran Domínguez MD   3 mL at 06/10/19 0845   • sodium chloride 0.9 % flush 3-10 mL  3-10 mL Intravenous PRN Kiran Domínguez MD       • sodium chloride 0.9 % infusion  100 mL/hr Intravenous Continuous Kiran Domínguez  mL/hr at 06/10/19 0605 100 mL/hr at 06/10/19 0605       Review of Systems:   -A 14 point review of systems is completed and is negative except for malaise      Objective      Vital Signs  Temp:  [97.7 °F (36.5 °C)-98.1 °F (36.7 °C)] 97.9 °F (36.6 °C)  Heart Rate:  [64-72] 72  Resp:   [16-18] 18  BP: (105-150)/(59-80) 105/59    Physical Exam:    General Exam:  Head:  Normal cephalic, atraumatic  HEENT:  Neck supple  Fundoscopic Exam:  No signs of disc edema  CVS:  Regular rate and rhythm.  No murmurs  Carotid Examination:  No bruits  Lungs:  Clear to auscultation  Abdomen:  Non-tender, Non-distended  Extremities:  No signs of peripheral edema  Skin:  No rashes    Neurologic Exam:    Mental Status:    -Awake, Alert, Oriented X 3  -No word finding difficulties  -No aphasia  -No dysarthria  -Follows simple and complex commands    CN II:  Visual fields full.  Pupils equally reactive to light  CN III, IV, VI:  Extraocular Muscles full with no signs of nystagmus  CN V:  Facial sensory is symmetric with no asymmetries.  CN VII:  Facial motor symmetric  CN VIII:  Gross hearing intact bilaterally  CN IX:  Palate elevates symmetrically  CN X:  Palate elevates symmetrically  CN XI:  Shoulder shrug symmetric  CN XII:  Tongue is midline on protrusion    Motor: (strength out of 5:  1= minimal movement, 2 = movement in plane of gravity, 3 = movement against gravity, 4 = movement against some resistance, 5 = full strength)    -Right Upper Ext: Proximal: 5 Distal: 5  -Left Upper Ext: Proximal: 5 Distal: 5    -Right Lower Ext: Proximal: 5 Distal: 5  -Left Lower Ext: Proximal: 5 Distal: 5    DTR:  -Right   Bicep: 2+ Tricep: 2+ Brachioradialis: 2+   Patella: 2+ Ankle: 2+ Neg Babinski  -Left   Bicep: 2+ Tricep: 2+ Brachioradialis: 2+   Patella: 2+ Ankle: 2+ Neg Babinski    Sensory:  -Intact to light touch, pinprick, temperature, pain, and proprioception    Coordination:  -Finger to nose intact  -Heel to shin intact  -No ataxia    Gait  -No signs of ataxia  -ambulates unassisted       Results Review:    I reviewed the patient's new clinical results.    Results from last 7 days   Lab Units 06/10/19  0436 06/09/19  1151   WBC 10*3/mm3 4.98 6.85   HEMOGLOBIN g/dL 9.7* 11.0*   HEMATOCRIT % 28.7* 32.3*   PLATELETS 10*3/mm3  179 209        Results from last 7 days   Lab Units 06/10/19  0436 06/09/19  1151   SODIUM mmol/L 141 139   POTASSIUM mmol/L 3.8 3.7   CHLORIDE mmol/L 112* 105   CO2 mmol/L 19.0* 20.0*   BUN mg/dL 30* 33*   CREATININE mg/dL 1.70* 2.56*   CALCIUM mg/dL 8.5 9.1   BILIRUBIN mg/dL  --  0.3   ALK PHOS U/L  --  120   ALT (SGPT) U/L  --  <15   AST (SGOT) U/L  --  30   GLUCOSE mg/dL 83 132*        Lab Results   Component Value Date    PROTIME 19.2 (H) 06/09/2019    INR 1.56 (H) 06/09/2019     No components found for: POCGLUC  No components found for: A1C  Lab Results   Component Value Date    HDL 14 (L) 06/10/2019    LDL 63 06/10/2019     No components found for: B12  No results found for: TSH    MRI brain reviewed by me.  No acute findings.  Assessment/Plan     Hospital Problem List      Encephalopathy    Impression:  1.  Metabolic encephalopathy likely caused by ANÍBAL  2.  Acute Kidney Injury  3.  Abnormal chest XR but has been treated by her PCP for pneumonia recently.  Currently is afebrile, without cough, and normal lung sounds and so this likely represents only a lag of the radiology behind the clinical improvement.  4.  No signs of CNS pathology including stroke    Plan:  · Cancel ID consult as I do not believe there is an active infectious etiology  · Consult nephrology as after fluids she still has a Cr above her baseline in January of 1.3  · Will plan to D/C home tomorrow after nephrology consult if they clear her  · I believe her initial symptoms were due to the ANÍBAL  · No indication to treat pneumonia at this time        Kiran Domínguez MD  06/10/19  3:07 PM

## 2019-06-10 NOTE — THERAPY EVALUATION
Acute Care - Speech Language Pathology   Swallow Initial Evaluation [facility]     Patient Name: Anjelica Gee  : 1950  MRN: [id number]  Today's Date: 6/10/2019  Onset of Illness/Injury or Date of Surgery: 19     Referring Physician: Dr. Domínguez      Admit Date: 2019  Clinical bedside swallow evaluation completed. Patient was alert and cooperative with son present. She was sitting upright in bedside chair. Previous VFSS completed 17 with dysphagia noted. She tells me that she has not completed swallowing therapy and never modified her diet. Oral motor assessment was unremarkable. CXR with right upper lobe opacities. Patient completed a full range of consistencies except mechanical soft. No overt s/s were observed given liquid trials. Functional rotary chew given regular solid trial. SLP unable to fully rule out aspiration at bedside. SLP will follow up for diet tolerance.     SLP recommends:   1) Regular diet   2) Thin liquids   3) Meds whole with thin liquids   4) Oral care.     SLP also ordered for speech language evaluation, patient has returned to baseline. Full evaluation is not warranted at this time.     Patient was educated RE: s/s of aspiration and oral care.   Franny Canales, MS CCC-SLP 6/10/2019 9:25 AM    Visit Dx:     ICD-10-CM ICD-9-CM   1. Dysphagia, unspecified type R13.10 787.20   2. Encephalopathy G93.40 348.30     Patient Active Problem List   Diagnosis   • Pharyngoesophageal dysphagia   • LPRD (laryngopharyngeal reflux disease)   • Chronic laryngitis   • Postoperative hypothyroidism   • Goiter   • Chronic rhinitis   • Encephalopathy     Past Medical History:   Diagnosis Date   • Chronic laryngitis 2017   • Chronic rhinitis 2017   • Depression    • Goiter 2017   • High cholesterol    • Hypertension    • Hypothyroidism    • LPRD (laryngopharyngeal reflux disease) 2017   • Postoperative hypothyroidism 2017     Past Surgical History:   Procedure  Laterality Date   • BLADDER SURGERY      sling   • HYSTERECTOMY     • THYROIDECTOMY, PARTIAL     • THYROIDECTOMY, PARTIAL Left    • TONSILLECTOMY     • TONSILLECTOMY          SWALLOW EVALUATION (last 72 hours)      SLP Adult Swallow Evaluation     Row Name 06/10/19 0810                   Rehab Evaluation    Document Type  evaluation  -MM        Subjective Information  no complaints  -MM        Patient Observations  alert;cooperative;agree to therapy  -MM        Patient/Family Observations  Son present.   -MM        Patient Effort  good  -MM        Symptoms Noted During/After Treatment  none  -MM           General Information    Patient Profile Reviewed  yes  -MM        Pertinent History Of Current Problem  Hx of brainstem encephalitits, chronic laryngitis, laryngopharyngeal reflux, acute kidney disease, MRI: no acute findings, CXR: right upper lobe opacities possible pneumonia, VFSS 11/6/17   -MM        Current Method of Nutrition  NPO  -MM        Precautions/Limitations, Vision  WFL;for purposes of eval  -MM        Precautions/Limitations, Hearing  WFL;for purposes of eval  -MM        Prior Level of Function-Communication  WFL  -MM        Prior Level of Function-Swallowing  no diet consistency restrictions  -MM        Plans/Goals Discussed with  patient and family;other (see comments);agreed upon RN Sharon   -MM        Barriers to Rehab  none identified  -MM        Patient's Goals for Discharge  return to PO diet  -MM        Family Goals for Discharge  patient able to return to PO diet  -MM           Pain Assessment    Additional Documentation  Pain Scale: Numbers Pre/Post-Treatment (Group)  -MM           Pain Scale: Numbers Pre/Post-Treatment    Pain Scale: Numbers, Pretreatment  0/10 - no pain  -MM        Pain Scale: Numbers, Post-Treatment  0/10 - no pain  -MM           Oral Motor and Function    Dentition Assessment  upper dentures/partial in place;lower dentures/partial in place  -MM        Secretion Management   WNL/WFL  -MM        Mucosal Quality  moist, healthy  -MM        Volitional Swallow  unable to elicit  -MM        Volitional Cough  WFL  -MM           Oral Musculature and Cranial Nerve Assessment    Oral Motor General Assessment  WFL  -MM           General Eating/Swallowing Observations    Respiratory Support Currently in Use  room air  -MM        Eating/Swallowing Skills  self-fed;fed by SLP  -MM        Positioning During Eating  upright in chair  -MM        Utensils Used  spoon;cup;straw  -MM        Consistencies Trialed  regular textures;honey-thick liquids;nectar/syrup-thick liquids;thin liquids;pureed  -MM           Respiratory    Respiratory Status  WFL  -MM           Clinical Swallow Eval    Oral Prep Phase  WFL  -MM        Oral Transit  WFL  -MM        Oral Residue  WFL  -MM        Pharyngeal Phase  no overt signs/symptoms of pharyngeal impairment  -MM        Esophageal Phase  suspected esophageal impairment  -MM        Clinical Swallow Evaluation Summary  Clinical bedside swallow evaluation completed. Patient was alert and cooperative with son present. She was sitting upright in bedside chair. Previous VFSS completed 11-6-17 with dysphagia noted. She tells me that she has not completed swallowing therapy and never modified her diet. Oral motor assessment was unremarkable. CXR with right upper lobe opacities. Patient completed a full range of consistencies except mechanical soft. No overt s/s were observed given liquid trials. Functional rotary chew given regular solid trial. SLP unable to fully rule out aspiration at bedside. SLP will follow up for diet tolerance. SLP recommends: 1) Regular diet 2) Thin liquids 3) Meds whole with thin liquids 4) Oral care. SLP also ordered for speech language evaluation, patient has returned to baseline. Full evaluation is not warranted at this time.   -MM           Esophageal Phase Concerns    Esophageal Phase Concerns  other (see comments) hx of laryngopharyngeal reflux   -MM           Clinical Impression    SLP Swallowing Diagnosis  functional oral phase;suspected pharyngeal dysfunction  -MM        Functional Impact  risk of aspiration/pneumonia  -MM        Rehab Potential/Prognosis, Swallowing  good, to achieve stated therapy goals  -MM        Swallow Criteria for Skilled Therapeutic Interventions Met  demonstrates skilled criteria  -MM           Recommendations    Therapy Frequency (Swallow)  PRN  -MM        Predicted Duration Therapy Intervention (Days)  until discharge  -MM        SLP Diet Recommendation  regular textures;thin liquids  -MM        Recommended Precautions and Strategies  upright posture during/after eating;small bites of food and sips of liquid  -MM        SLP Rec. for Method of Medication Administration  meds whole;with thin liquids;as tolerated  -MM        Monitor for Signs of Aspiration  yes;notify SLP if any concerns;cough;gurgly voice;throat clearing;pneumonia;right lower lobe infiltrates  -MM        Anticipated Dischage Disposition  home  -MM           Swallow Goals (SLP)    Oral Nutrition/Hydration Goal Selection (SLP)  oral nutrition/hydration, SLP goal 1  -MM           Oral Nutrition/Hydration Goal 1 (SLP)    Oral Nutrition/Hydration Goal 1, SLP  LTG: Patient will tolerate LRD without s/s of aspiration.  -MM        Time Frame (Oral Nutrition/Hydration Goal 1, SLP)  by discharge  -MM        Barriers (Oral Nutrition/Hydration Goal 1, SLP)  n/a  -MM        Progress/Outcomes (Oral Nutrition/Hydration Goal 1, SLP)  goal ongoing  -MM          User Key  (r) = Recorded By, (t) = Taken By, (c) = Cosigned By    Initials Name Effective Dates    Franny Reynolds, MS CCC-SLP 05/24/19 -           EDUCATION  The patient has been educated in the following areas:   Dysphagia (Swallowing Impairment) Oral Care/Hydration.    SLP Recommendation and Plan  SLP Swallowing Diagnosis: functional oral phase, suspected pharyngeal dysfunction  SLP Diet Recommendation: regular  textures, thin liquids  Recommended Precautions and Strategies: upright posture during/after eating, small bites of food and sips of liquid  SLP Rec. for Method of Medication Administration: meds whole, with thin liquids, as tolerated     Monitor for Signs of Aspiration: yes, notify SLP if any concerns, cough, gurgly voice, throat clearing, pneumonia, right lower lobe infiltrates     Swallow Criteria for Skilled Therapeutic Interventions Met: demonstrates skilled criteria  Anticipated Dischage Disposition: home  Rehab Potential/Prognosis, Swallowing: good, to achieve stated therapy goals  Therapy Frequency (Swallow): PRN  Predicted Duration Therapy Intervention (Days): until discharge       Plan of Care Reviewed With: patient, other (see comments), son(ABILIO Herrera )  Plan of Care Review  Plan of Care Reviewed With: patient, other (see comments), son(ABILIO Herrera )  Progress: no change(Initial Evaluation )  Outcome Summary: Clinical bedside swallow evaluation completed. Patient was alert and cooperative with son present. She was sitting upright in bedside chair. Previous VFSS completed 11-6-17 with dysphagia noted. She tells me that she has not completed swallowing therapy and never modified her diet. Oral motor assessment was unremarkable. CXR with right upper lobe opacities. Patient completed a full range of consistencies except mechanical soft. No overt s/s were observed given liquid trials. Functional rotary chew given regular solid trial. SLP unable to fully rule out aspiration at bedside. SLP will follow up for diet tolerance. SLP recommends: 1) Regular diet 2) Thin liquids 3) Meds whole with thin liquids 4) Oral care. SLP also ordered for speech language evaluation, patient has returned to baseline. Full evaluation is not warranted at this time.     SLP GOALS     Row Name 06/10/19 0810             Oral Nutrition/Hydration Goal 1 (SLP)    Oral Nutrition/Hydration Goal 1, SLP  LTG: Patient will tolerate LRD without s/s  of aspiration.  -MM      Time Frame (Oral Nutrition/Hydration Goal 1, SLP)  by discharge  -MM      Barriers (Oral Nutrition/Hydration Goal 1, SLP)  n/a  -MM      Progress/Outcomes (Oral Nutrition/Hydration Goal 1, SLP)  goal ongoing  -MM        User Key  (r) = Recorded By, (t) = Taken By, (c) = Cosigned By    Initials Name Provider Type    Franny Reynolds MS CCC-SLP Speech and Language Pathologist             Time Calculation:   Time Calculation- SLP     Row Name 06/10/19 0924             Time Calculation- SLP    SLP Start Time  0810  -MM      SLP Stop Time  0924  -MM      SLP Time Calculation (min)  74 min  -MM      SLP Received On  06/10/19  -MM      SLP Goal Re-Cert Due Date  06/20/19  -MM        User Key  (r) = Recorded By, (t) = Taken By, (c) = Cosigned By    Initials Name Provider Type    Franny Reynolds MS CCC-SLP Speech and Language Pathologist          Therapy Charges for Today     Code Description Service Date Service Provider Modifiers Qty    [id number] HC ST EVAL ORAL PHARYNG SWALLOW 5 6/10/2019 Franny Canales MS CCC-SLP GN 1               Franny Canales MS CCC-ALONSO  6/10/2019

## 2019-06-10 NOTE — ACP (ADVANCE CARE PLANNING)
Date of [facility] ACP interview: 6/10/2019  Location of interview: Patient's Room  [facility] ACP Facilitator: Costa Beck  Referral Source: Nurse  Present for facilitation: Patient    SUMMARY OF ADVANCE CARE PLANNING DISCUSSION:  Anjelica presents for [facility] facilitation. We reviewed purpose and goals for advance care planning.    Education materials were provided on: Advance Care Planning    Each section of the advance care/living will document was reviewed and discussed.  She decided to review the materials explaining the Advance Directive and complete it later.      Advance care/living will document finished during this facilitation? no    Time spent on preparation, facilitation and documentation was under 30 minutes.    RECOMMENDATIONS/FOLLOW-UP:  Encouraged her to speak with her son and the rest of her family about her wishes concerning end-of-life care.    CONSULT/NOTE ROUTED  No.    Costa Beck

## 2019-06-10 NOTE — PLAN OF CARE
Problem: Patient Care Overview  Goal: Plan of Care Review  Outcome: Ongoing (interventions implemented as appropriate)   06/10/19 2926   Coping/Psychosocial   Plan of Care Reviewed With patient   Plan of Care Review   Progress no change   OTHER   Outcome Summary OT eval completed. Pt. performs ADLs, fxl mobility, t/fs, pencil/paper tasks I'ly. She has no focal neurological deficits & reports she is at baseline. No further OT tx needed at this time. Anticipate DC with son home

## 2019-06-10 NOTE — PLAN OF CARE
Problem: Patient Care Overview  Goal: Plan of Care Review  Outcome: Ongoing (interventions implemented as appropriate)   06/10/19 5525   Coping/Psychosocial   Plan of Care Reviewed With patient   Plan of Care Review   Progress improving   OTHER   Outcome Summary PT evaluation completed. The patient is at her functional baseline with no focal neurological deficits in her mobility. She does demonstrate a slightlly speech difficulty with forming words at times and minimal difficulty with memory recall. Pt has no further needs for PT at this time. Recommend home at discharge.

## 2019-06-11 ENCOUNTER — APPOINTMENT (OUTPATIENT)
Dept: ULTRASOUND IMAGING | Facility: HOSPITAL | Age: 69
End: 2019-06-11

## 2019-06-11 VITALS
TEMPERATURE: 97.6 F | RESPIRATION RATE: 18 BRPM | HEART RATE: 70 BPM | BODY MASS INDEX: 27.3 KG/M2 | DIASTOLIC BLOOD PRESSURE: 58 MMHG | HEIGHT: 70 IN | WEIGHT: 190.7 LBS | SYSTOLIC BLOOD PRESSURE: 136 MMHG | OXYGEN SATURATION: 98 %

## 2019-06-11 PROBLEM — G93.40 ENCEPHALOPATHY: Status: RESOLVED | Noted: 2019-06-09 | Resolved: 2019-06-11

## 2019-06-11 PROBLEM — G93.41 ACUTE METABOLIC ENCEPHALOPATHY: Status: ACTIVE | Noted: 2019-06-11

## 2019-06-11 PROBLEM — N19 ACUTE PRERENAL AZOTEMIA: Status: ACTIVE | Noted: 2019-06-11

## 2019-06-11 LAB
ANION GAP SERPL CALCULATED.3IONS-SCNC: 9 MMOL/L (ref 4–13)
BUN BLD-MCNC: 26 MG/DL (ref 5–21)
BUN/CREAT SERPL: 17.3 (ref 7–25)
CALCIUM SPEC-SCNC: 8.3 MG/DL (ref 8.4–10.4)
CHLORIDE SERPL-SCNC: 113 MMOL/L (ref 98–110)
CO2 SERPL-SCNC: 19 MMOL/L (ref 24–31)
CREAT BLD-MCNC: 1.5 MG/DL (ref 0.5–1.4)
GFR SERPL CREATININE-BSD FRML MDRD: 35 ML/MIN/1.73
GLUCOSE BLD-MCNC: 94 MG/DL (ref 70–100)
POTASSIUM BLD-SCNC: 4 MMOL/L (ref 3.5–5.3)
PTH-INTACT SERPL-MCNC: 95.1 PG/ML (ref 7.5–53.5)
SODIUM BLD-SCNC: 141 MMOL/L (ref 135–145)
URATE SERPL-MCNC: 5.5 MG/DL (ref 2.7–7.5)

## 2019-06-11 PROCEDURE — 92526 ORAL FUNCTION THERAPY: CPT | Performed by: SPEECH-LANGUAGE PATHOLOGIST

## 2019-06-11 PROCEDURE — 83970 ASSAY OF PARATHORMONE: CPT | Performed by: INTERNAL MEDICINE

## 2019-06-11 PROCEDURE — 96361 HYDRATE IV INFUSION ADD-ON: CPT

## 2019-06-11 PROCEDURE — 99217 PR OBSERVATION CARE DISCHARGE MANAGEMENT: CPT | Performed by: PSYCHIATRY & NEUROLOGY

## 2019-06-11 PROCEDURE — 80048 BASIC METABOLIC PNL TOTAL CA: CPT | Performed by: NURSE PRACTITIONER

## 2019-06-11 PROCEDURE — G0378 HOSPITAL OBSERVATION PER HR: HCPCS

## 2019-06-11 PROCEDURE — 76775 US EXAM ABDO BACK WALL LIM: CPT

## 2019-06-11 PROCEDURE — 84550 ASSAY OF BLOOD/URIC ACID: CPT | Performed by: INTERNAL MEDICINE

## 2019-06-11 RX ADMIN — MELOXICAM 7.5 MG: 7.5 TABLET ORAL at 08:51

## 2019-06-11 RX ADMIN — CLOPIDOGREL 75 MG: 75 TABLET, FILM COATED ORAL at 08:51

## 2019-06-11 RX ADMIN — SODIUM CHLORIDE, PRESERVATIVE FREE 3 ML: 5 INJECTION INTRAVENOUS at 08:52

## 2019-06-11 RX ADMIN — LEVOTHYROXINE SODIUM 88 MCG: 88 TABLET ORAL at 05:31

## 2019-06-11 RX ADMIN — SODIUM CHLORIDE 100 ML/HR: 9 INJECTION, SOLUTION INTRAVENOUS at 01:46

## 2019-06-11 RX ADMIN — FUROSEMIDE 10 MG: 20 TABLET ORAL at 08:51

## 2019-06-11 RX ADMIN — PANTOPRAZOLE SODIUM 40 MG: 40 TABLET, DELAYED RELEASE ORAL at 05:31

## 2019-06-11 RX ADMIN — CETIRIZINE HYDROCHLORIDE 10 MG: 10 TABLET, FILM COATED ORAL at 08:51

## 2019-06-11 RX ADMIN — FLUOXETINE HYDROCHLORIDE 20 MG: 20 CAPSULE ORAL at 08:51

## 2019-06-11 NOTE — PLAN OF CARE
Problem: Patient Care Overview  Goal: Plan of Care Review  Outcome: Ongoing (interventions implemented as appropriate)   06/11/19 0115   Coping/Psychosocial   Plan of Care Reviewed With patient   Plan of Care Review   Progress improving   OTHER   Outcome Summary up with SBA. rested well. NPO for renal us in am. no c/o. IVF continued BUN and creat improving.     Goal: Individualization and Mutuality  Outcome: Ongoing (interventions implemented as appropriate)    Goal: Discharge Needs Assessment  Outcome: Ongoing (interventions implemented as appropriate)    Goal: Interprofessional Rounds/Family Conf  Outcome: Ongoing (interventions implemented as appropriate)      Problem: Skin Injury Risk (Adult)  Goal: Identify Related Risk Factors and Signs and Symptoms  Outcome: Ongoing (interventions implemented as appropriate)    Goal: Skin Health and Integrity  Outcome: Ongoing (interventions implemented as appropriate)      Problem: Fall Risk (Adult)  Goal: Absence of Fall  Outcome: Ongoing (interventions implemented as appropriate)

## 2019-06-11 NOTE — PROGRESS NOTES
Nephrology ([facility]) Progress Note      Patient:  Anjelica Gee  YOB: 1950  Date of Service: 6/11/2019  MRN: [id number]   Acct: [id number]   Primary Care Physician: Reynaldo Garcia MD  Advance Directive:   Code Status and Medical Interventions:   Ordered at: 06/09/19 1222     Level Of Support Discussed With:    Patient     Code Status:    CPR     Medical Interventions (Level of Support Prior to Arrest):    Full     Admit Date: 6/9/2019       Hospital Day: 0  Referring Provider: Kiran Domínguez MD      Patient personally seen and examined.  Complete chart including Consults, Notes, Operative Reports, Labs, Cardiology, and Radiology studies reviewed as able.        Subjective:  Anjelica Gee is a 69 y.o. female  whom we were consulted for acute kidney injury.  History of benign hypertension and osteoarthritis. Has taken Meloxicam and other NSAIDs long-term.  Baseline creatinine 1.3 mg upon review of old labs.  Does not follow with any of our providers.  Presented with slurred speech; admitted for workup of potential CVA/TIA.  Incidental finding of creatinine 2.5 mg on admission.  Denies recent change in urination; no dysuria or hematuria.  No n/v/d.    Today feeling better; anxious for discharge. No new overnight issues. Urine output nonoliguric    Allergies:  Ace inhibitors; Amlodipine besy-benazepril hcl; Aspirin; B12 folate  [folic acid-vit b6-vit b12]; Codeine; Demerol [meperidine]; Detrol  [tolterodine tartrate]; Latex; Morphine and related; Penicillins; and Sulfa antibiotics    Home Meds:  Medications Prior to Admission   Medication Sig Dispense Refill Last Dose   • carvedilol (COREG) 6.25 MG tablet Take 6.25 mg by mouth 2 times daily (with meals).     Taking   • cetirizine (zyrTEC) 10 MG tablet Take 10 mg by mouth.   Taking   • Cholecalciferol (VITAMIN D) 2000 units capsule Take  by mouth.   Taking   • FLUoxetine (PROzac) 20 MG capsule Take 20 mg by mouth.    Taking   • furosemide (LASIX) 20 MG tablet    Taking   • gemfibrozil (LOPID) 600 MG tablet Take 600 mg by mouth 2 (Two) Times a Day Before Meals.      • levothyroxine (SYNTHROID, LEVOTHROID) 88 MCG tablet Take  by mouth.   Taking   • losartan (COZAAR) 100 MG tablet    Taking   • lovastatin (MEVACOR) 20 MG tablet   5 Taking   • meloxicam (MOBIC) 15 MG tablet   5 Taking   • pantoprazole (PROTONIX) 40 MG EC tablet    Taking   • PARoxetine (PAXIL) 40 MG tablet Take 40 mg by mouth every morning.     Taking   • vitamin C (ASCORBIC ACID) 250 MG tablet Take 250 mg by mouth.   Taking   • acetaminophen (TYLENOL) 325 MG tablet Take 650 mg by mouth.   Taking   • mupirocin (BACTROBAN) 2 % ointment Apply  topically 3 (Three) Times a Day. intranasal 22 g 0 Taking       Medicines:  Current Facility-Administered Medications   Medication Dose Route Frequency Provider Last Rate Last Dose   • acetaminophen (TYLENOL) tablet 650 mg  650 mg Oral Q4H PRN Kiran Domínguez MD       • atorvastatin (LIPITOR) tablet 80 mg  80 mg Oral Nightly Kiran Domínguez MD   80 mg at 06/10/19 2028   • cetirizine (zyrTEC) tablet 10 mg  10 mg Oral Daily Kiran Domínguez MD   10 mg at 06/11/19 0851   • clopidogrel (PLAVIX) tablet 75 mg  75 mg Oral Daily Kiran Domínguez MD   75 mg at 06/11/19 0851   • FLUoxetine (PROzac) capsule 20 mg  20 mg Oral Daily Kiran Domínguez MD   20 mg at 06/11/19 0851   • furosemide (LASIX) tablet 10 mg  10 mg Oral Daily Kiran Domínguez MD   10 mg at 06/11/19 0851   • levothyroxine (SYNTHROID, LEVOTHROID) tablet 88 mcg  88 mcg Oral Q AM Kiran Domínguez MD   88 mcg at 06/11/19 0531   • meloxicam (MOBIC) tablet 7.5 mg  7.5 mg Oral Daily Kiran Domínguez MD   7.5 mg at 06/11/19 0851   • pantoprazole (PROTONIX) EC tablet 40 mg  40 mg Oral Q AM Kiran Domínguez MD   40 mg at 06/11/19 0531   • sodium chloride 0.9 % flush 10 mL  10 mL Intravenous PRN Khanh Holm MD       • sodium chloride 0.9 % flush 3 mL   3 mL Intravenous Q12H Kiran Domínguez MD   3 mL at 06/11/19 0852   • sodium chloride 0.9 % flush 3-10 mL  3-10 mL Intravenous PRN Kiran Domínguez MD       • sodium chloride 0.9 % infusion  100 mL/hr Intravenous Continuous Kiran Domínguez  mL/hr at 06/11/19 0851 100 mL/hr at 06/11/19 0851       Past Medical History:  Past Medical History:   Diagnosis Date   • Chronic laryngitis 12/4/2017   • Chronic rhinitis 12/4/2017   • Depression    • Goiter 12/4/2017   • High cholesterol    • Hypertension    • Hypothyroidism    • LPRD (laryngopharyngeal reflux disease) 12/4/2017   • Postoperative hypothyroidism 12/4/2017       Past Surgical History:  Past Surgical History:   Procedure Laterality Date   • BLADDER SURGERY      sling   • HYSTERECTOMY     • THYROIDECTOMY, PARTIAL     • THYROIDECTOMY, PARTIAL Left    • TONSILLECTOMY     • TONSILLECTOMY         Family History  Family History   Problem Relation Age of Onset   • No Known Problems Father    • No Known Problems Mother    • Kidney cancer Sister    • Prostate cancer Brother        Social History  Social History     Socioeconomic History   • Marital status:      Spouse name: Not on file   • Number of children: Not on file   • Years of education: Not on file   • Highest education level: Not on file   Tobacco Use   • Smoking status: Never Smoker   • Smokeless tobacco: Never Used   Substance and Sexual Activity   • Alcohol use: No   • Drug use: No         Review of Systems:  History obtained from chart review and the patient  General ROS: No fever or chills  Respiratory ROS: No cough, shortness of breath, wheezing  Cardiovascular ROS: No chest pain or palpitations  Gastrointestinal ROS: No abdominal pain or melena  Genito-Urinary ROS: No dysuria or hematuria  Neurological ROS: no headache or dizziness    Objective:  Patient Vitals for the past 24 hrs:   BP Temp Temp src Pulse Resp SpO2   06/11/19 0813 139/50 97.9 °F (36.6 °C) Oral 63 18 96 %   06/11/19  0402 149/80 97.9 °F (36.6 °C) Oral 71 20 99 %   06/10/19 2329 143/52 98.1 °F (36.7 °C) Oral 69 20 98 %   06/10/19 2011 130/62 98 °F (36.7 °C) Oral 67 20 98 %   06/10/19 1613 115/57 97.9 °F (36.6 °C) Oral 70 20 97 %       Intake/Output Summary (Last 24 hours) at 6/11/2019 1051  Last data filed at 6/11/2019 0813  Gross per 24 hour   Intake 990 ml   Output 2500 ml   Net -1510 ml     General: awake/alert    Neck: supple, no JVD  Chest:  clear to auscultation bilaterally without respiratory distress  CVS: regular rate and rhythm  Abdominal: soft, nontender, positive bowel sounds  Extremities: no cyanosis or edema  Skin: warm and dry without rash  Neuro: no focal motor deficits    Labs:  Results from last 7 days   Lab Units 06/10/19  0436 06/09/19  1151   WBC 10*3/mm3 4.98 6.85   HEMOGLOBIN g/dL 9.7* 11.0*   HEMATOCRIT % 28.7* 32.3*   PLATELETS 10*3/mm3 179 209         Results from last 7 days   Lab Units 06/11/19  0506 06/10/19  0436 06/09/19  1151   SODIUM mmol/L 141 141 139   POTASSIUM mmol/L 4.0 3.8 3.7   CHLORIDE mmol/L 113* 112* 105   CO2 mmol/L 19.0* 19.0* 20.0*   BUN mg/dL 26* 30* 33*   CREATININE mg/dL 1.50* 1.70* 2.56*   CALCIUM mg/dL 8.3* 8.5 9.1   BILIRUBIN mg/dL  --   --  0.3   ALK PHOS U/L  --   --  120   ALT (SGPT) U/L  --   --  <15   AST (SGOT) U/L  --   --  30   GLUCOSE mg/dL 94 83 132*       Radiology:   Imaging Results (last 72 hours)     Procedure Component Value Units Date/Time    US Renal Bilateral [[id number]] Collected:  06/11/19 0743     Updated:  06/11/19 0748    Narrative:       HISTORY: Acute kidney injury     Bilateral renal ultrasound: Sonographic imaging the kidneys and bladder  is performed. Images of the abdominal aorta appear normal in caliber.  Proximal IVC is patent.     Kidneys appear isoechoic compared to the liver. The right kidney  measures 10.0 x 4.5 x 5.4 cm. Left kidney measures 10.9 x 5.6 x 4.3 cm.  There is no solid or cystic renal mass. There is no hydronephrosis.  There is no  abnormal perinephric fluid collection.     Moderately distended bladder is unremarkable.       Impression:       1. No acute sonographic pathology of the kidneys.  This report was finalized on 06/11/2019 07:45 by Dr. Taylor Orr MD.    MRI Brain With & Without Contrast [[id number]] Collected:  06/09/19 1700     Updated:  06/09/19 1711    Narrative:       EXAM: MRI BRAIN W WO CONTRAST- - 6/9/2019 4:09 PM CDT     HISTORY: Stroke. Technologist obtained history indicates headaches,  difficulty with speech.     COMPARISON: 06/09/2019.      TECHNIQUE: Routine protocol MRI performed of the brain without and with  intravenous contrast.     FINDINGS:  No acute intracranial hemorrhage, midline shift, mass effect, or  restricted diffusion to indicate acute infarct. No abnormal enhancement.  Moderate confluent T2/FLAIR hyperintensity in the periventricular and  subcortical white matter, nonspecific, but most commonly chronic  microvascular changes. Ventricles, cisterns and sulci are normal in size  and configuration. Sella and midline structures within normal limits.  Brainstem and posterior fossa are within normal limits.      Visualized contrasted vessels and noncontrasted flow voids are within  normal limits. Thinning of the ocular lenses may be age-related or  postoperative. Visualized retrobulbar soft tissue, paranasal sinuses,  and mastoid air cells are within normal limits. Visualized overlying  soft tissues within normal limits.          Impression:       1. No acute intracranial abnormality. No evidence of intracranial  hemorrhage, mass or acute infarction.  2. Moderate chronic microvascular changes.  3. Normal aeration of the paranasal sinuses and mastoid air cells.     This report was finalized on 06/09/2019 17:08 by Dr Lizbeth Heard MD.    XR Chest 1 View [[id number]] Collected:  06/09/19 1223     Updated:  06/09/19 1227    Narrative:       EXAM: XR CHEST 1 VW- - 6/9/2019 12:21 PM CDT     HISTORY: Acute Stroke  Protocol (Onset < 12 hrs)       COMPARISON: 03/18/2016.      TECHNIQUE:  1 images.  Frontal view of the chest.     FINDINGS:    Right upper lobe opacity. No pneumothorax or pleural effusion. Cardiac  and mediastinal silhouette within normal limits. No acute bony finding.          Impression:       1. Right upper lobe opacity. In the appropriate clinical setting, this  could be seen with pneumonia. Recommend follow-up after treatment to  evaluate for resolution.  This report was finalized on 06/09/2019 12:24 by Dr Lizbeth Heard MD.    CT Head Without Contrast Stroke Protocol [[id number]] Collected:  06/09/19 1153     Updated:  06/09/19 1158    Narrative:       EXAM: CT HEAD WO CONTRAST STROKE PROTOCOL- - 6/9/2019 11:40 AM CDT     HISTORY: Stroke. Slurred speech.     COMPARISON: None.      DOSE LENGTH PRODUCT: 673 mGy cm. Automated exposure control was also  utilized to decrease patient radiation dose.     TECHNIQUE: Unenhanced axial CT images obtained from vertex to skull  base.     FINDINGS:  No acute intracranial hemorrhage, midline shift, mass effect or large  territory cortical infarct. Ventricles, sulci, basilar cisterns are  normal in size and configuration for the patient's age. Moderate  periventricular and subcortical white matter hypodensities, most likely  due to chronic microvascular disease.     Thinning of the ocular lenses may be age-related or postoperative.  Visualized retrobulbar soft tissue within normal limits. Paranasal  sinuses, mastoid air cells and external auditory canals are within  normal limits. Calvarium appears intact. Subcutaneous tissues within  normal limits.          Impression:       1. No acute intracranial findings.  2. Probable moderate chronic microvascular disease.     Results communicated by telephone to Dr. Khanh Hogan at 11:54 AM on  06/09/2019.  This report was finalized on 06/09/2019 11:55 by Dr Lizbeth Heard MD.          Culture:         Assessment   1.  Acute  kidney injury; likely prerenal azotemia--resolving  2.  Baseline chronic kidney disease stage 3 due to chronic NSAID use  3.  Benign hypertension  4.  Transient ischemic attack  5.  Secondary hyperparathyroidism    Plan:  1.  Renal function has improved with IV volume replacement.   2.  Patient encouraged to avoid NSAIDs  3.  OK for discharge from renal standpoint. Follow up in office in 1-2 weeks      THERESA Lamas  6/11/2019  10:51 AM

## 2019-06-11 NOTE — PLAN OF CARE
Problem: Patient Care Overview  Goal: Plan of Care Review  Outcome: Ongoing (interventions implemented as appropriate)   06/11/19 2988   Coping/Psychosocial   Plan of Care Reviewed With patient   Plan of Care Review   Progress improving   OTHER   Outcome Summary Patient appears back to baseline. ST services no longer warranted. Tolerating diet with no overt s/s of aspiration. No concerns with speech or language. Please reconsult if any change.

## 2019-06-11 NOTE — THERAPY DISCHARGE NOTE
Acute Care - Speech Language Pathology   Swallow Treatment Note/Discharge   [facility]     Patient Name: Anjelica Gee  : 1950  MRN: [id number]  Today's Date: 2019  Onset of Illness/Injury or Date of Surgery: 19     Referring Physician: Dr. Domínguez      Admit Date: 2019    Patient appears back to baseline.  ST services no longer warranted.  Tolerating diet with no overt s/s of aspiration.  No concerns with speech or language.  Please reconsult if any change.   Amie Vaca MS CCC-SLP 2019 9:16 AM    Visit Dx:      ICD-10-CM ICD-9-CM   1. Dysphagia, unspecified type R13.10 787.20   2. Encephalopathy G93.40 348.30     Patient Active Problem List   Diagnosis   • Pharyngoesophageal dysphagia   • LPRD (laryngopharyngeal reflux disease)   • Chronic laryngitis   • Postoperative hypothyroidism   • Goiter   • Chronic rhinitis   • Encephalopathy       Therapy Treatment  Rehabilitation Treatment Summary     Row Name 19             Treatment Time/Intention    Discipline  speech language pathologist  -KW      Document Type  therapy note (daily note)  -KW      Subjective Information  no complaints  -KW      Mode of Treatment  individual therapy;speech-language pathology  -KW      Patient/Family Observations  no family present  -KW      Care Plan Review  care plan/treatment goals reviewed  -KW      Patient Effort  good  -KW      Recorded by [KW] Amie Vaca MS CCC-SLP 19      Row Name 19             Pain Assessment    Additional Documentation  Pain Scale: FACES Pre/Post-Treatment (Group)  -KW      Recorded by [LETICIA] Amie Vaca MS CCC-SLP 19      Row Name 19             Pain Scale: FACES Pre/Post-Treatment    Pain: FACES Scale, Pretreatment  0-->no hurt  -KW      Pain: FACES Scale, Post-Treatment  0-->no hurt  -KW      Recorded by [KW] Amie Vaca MS CCC-SLP 19      Row Name 19             Outcome  Summary/Treatment Plan (SLP)    Daily Summary of Progress (SLP)  progress toward functional goals is good  -KW      Barriers to Overall Progress (SLP)  n/a  -KW      Plan for Continued Treatment (SLP)  discharge from services  -KW      Anticipated Dischage Disposition  unknown  -KW      Recorded by [KW] Amie Vaca MS CCC-SLP 06/11/19 0913        User Key  (r) = Recorded By, (t) = Taken By, (c) = Cosigned By    Initials Name Effective Dates Discipline    KW Amie Vaca MS CCC-SLP 08/02/16 -  SLP        Outcome Summary  Outcome Summary/Treatment Plan (SLP)  Daily Summary of Progress (SLP): progress toward functional goals is good (06/11/19 0900 : Amie Vaca, MS CCC-SLP)  Barriers to Overall Progress (SLP): n/a (06/11/19 0900 : Amie Vaca, MS CCC-SLP)  Plan for Continued Treatment (SLP): discharge from services (06/11/19 0900 : Amie Vaca, MS CCC-SLP)  Anticipated Dischage Disposition: unknown (06/11/19 0900 : Amie Vaca, MS CCC-SLP)  Reason for Discharge: all goals and outcomes met, no further needs identified (06/11/19 0900 : Amie Vaca MS CCC-SLP)  SLP GOALS     Row Name 06/11/19 0900 06/10/19 0810          Oral Nutrition/Hydration Goal 1 (SLP)    Oral Nutrition/Hydration Goal 1, SLP  LTG: Patient will tolerate LRD without s/s of aspiration.  -KW  LTG: Patient will tolerate LRD without s/s of aspiration.  -MM     Time Frame (Oral Nutrition/Hydration Goal 1, SLP)  by discharge  -KW  by discharge  -MM     Barriers (Oral Nutrition/Hydration Goal 1, SLP)  n/a  -KW  n/a  -MM     Progress/Outcomes (Oral Nutrition/Hydration Goal 1, SLP)  goal met  -KW  goal ongoing  -MM       User Key  (r) = Recorded By, (t) = Taken By, (c) = Cosigned By    Initials Name Provider Type    Amie Gee MS CCC-SLP Speech and Language Pathologist    Franny Reynolds MS CCC-SLP Speech and Language Pathologist          EDUCATION  The patient has been educated in the following areas:   Dysphagia  (Swallowing Impairment).    SLP Recommendation and Plan  Daily Summary of Progress (SLP): progress toward functional goals is good  Barriers to Overall Progress (SLP): n/a  Plan for Continued Treatment (SLP): discharge from services  Anticipated Dischage Disposition: unknown        Reason for Discharge: all goals and outcomes met, no further needs identified           Time Calculation:   Time Calculation- SLP     Row Name 06/11/19 0915             Time Calculation- SLP    SLP Start Time  0900  -KW      SLP Stop Time  0910  -KW      SLP Time Calculation (min)  10 min  -KW      SLP Received On  06/11/19  -KW        User Key  (r) = Recorded By, (t) = Taken By, (c) = Cosigned By    Initials Name Provider Type    Amie Gee MS CCC-SLP Speech and Language Pathologist          Therapy Charges for Today     Code Description Service Date Service Provider Modifiers Qty    [id number] HC ST TREATMENT SWALLOW 1 6/11/2019 Amie Vaca MS CCC-ALONSO GN 1               SLP Discharge Summary  Anticipated Dischage Disposition: unknown  Reason for Discharge: all goals and outcomes met, no further needs identified  Progress Toward Achieving Short/long Term Goals: all goals met within established timelines  Discharge Destination: other (see comments)(still in acute)    MS DOUG Dia  6/11/2019

## 2019-06-11 NOTE — DISCHARGE SUMMARY
Date of Admission: 6/9/2019  Date of Discharge:  6/11/2019    Admitting Diagnosis: Metabolic encephalopathy  Discharge Diagnosis:   1.  Metabolic encephalopathy from acute kidney injury  2.  Prerenal azotemia on stage III chronic kidney disease  3.  History of hypertension  4.  Arthritis    Procedures Performed         Consults:   Consults     Date and Time Order Name Status Description    6/10/2019 1509 Inpatient Nephrology Consult Completed     6/9/2019 1137 Inpatient Neurology Consult Stroke      6/9/2019 1137 Inpatient Neurology Consult Stroke Completed           Presenting Problem/History of Present Illness  Encephalopathy [G93.40]     Pertinent Test Results:   Lab Results (last 72 hours)     Procedure Component Value Units Date/Time    Basic Metabolic Panel [[id number]]  (Abnormal) Collected:  06/11/19 0506    Specimen:  Blood Updated:  06/11/19 1023     Glucose 94 mg/dL      BUN 26 mg/dL      Creatinine 1.50 mg/dL      Sodium 141 mmol/L      Potassium 4.0 mmol/L      Chloride 113 mmol/L      CO2 19.0 mmol/L      Calcium 8.3 mg/dL      eGFR Non African Amer 35 mL/min/1.73      BUN/Creatinine Ratio 17.3     Anion Gap 9.0 mmol/L     Narrative:       GFR Normal >60  Chronic Kidney Disease <60  Kidney Failure <15    PTH, Intact [[id number]]  (Abnormal) Collected:  06/11/19 0506    Specimen:  Blood Updated:  06/11/19 0619     PTH, Intact 95.1 pg/mL     Uric Acid [[id number]]  (Normal) Collected:  06/11/19 0506    Specimen:  Blood Updated:  06/11/19 0606     Uric Acid 5.5 mg/dL     Sodium, Urine, Random - Urine, Clean Catch [[id number]]  (Abnormal) Collected:  06/10/19 2130    Specimen:  Urine, Clean Catch Updated:  06/10/19 2155     Sodium, Urine 98 mmol/L     Urea Nitrogen, Urine - Urine, Clean Catch [[id number]] Collected:  06/10/19 2130    Specimen:  Urine, Clean Catch Updated:  06/10/19 2155     Urea Nitrogen, Urine 364 mg/dL     Creatinine, Urine, Random - Urine, Clean Catch [[id number]] Collected:  06/10/19 2130     Specimen:  Urine, Clean Catch Updated:  06/10/19 2155     Creatinine, Urine 52.7 mg/dL     Blood Gas, Arterial [[id number]]  (Abnormal) Collected:  06/10/19 2001    Specimen:  Arterial Blood Updated:  06/10/19 2006     Site Right Radial     Michael's Test Positive     pH, Arterial 7.356 pH units      pCO2, Arterial 31.9 mm Hg      Comment: 84 Value below reference range        pO2, Arterial 93.1 mm Hg      HCO3, Arterial 17.8 mmol/L      Comment: 84 Value below reference range        Base Excess, Arterial -6.8 mmol/L      Comment: 84 Value below reference range        O2 Saturation, Arterial 97.4 %      Temperature 37.0 C      Barometric Pressure for Blood Gas 754 mmHg      Modality Room Air     Ventilator Mode NA     Collected by [id number]     Comment: Meter: [id number]     :  [id number]       POC Glucose Once [[id number]]  (Abnormal) Collected:  06/10/19 1314    Specimen:  Blood Updated:  06/10/19 1325     Glucose 170 mg/dL      Comment: : [id number] Ronald EncitenMeter ID: [id number]       Basic Metabolic Panel [[id number]]  (Abnormal) Collected:  06/10/19 0436    Specimen:  Blood Updated:  06/10/19 0941     Glucose 83 mg/dL      BUN 30 mg/dL      Creatinine 1.70 mg/dL      Sodium 141 mmol/L      Potassium 3.8 mmol/L      Chloride 112 mmol/L      CO2 19.0 mmol/L      Calcium 8.5 mg/dL      eGFR Non African Amer 30 mL/min/1.73      BUN/Creatinine Ratio 17.6     Anion Gap 10.0 mmol/L     Narrative:       GFR Normal >60  Chronic Kidney Disease <60  Kidney Failure <15    CBC & Differential [[id number]] Collected:  06/10/19 0436    Specimen:  Blood Updated:  06/10/19 0936    Narrative:       The following orders were created for panel order CBC & Differential.  Procedure                               Abnormality         Status                     ---------                               -----------         ------                     CBC Auto Differential[[id number]]        Abnormal            Final result                  Please view results for these tests on the individual orders.    CBC Auto Differential [[id number]]  (Abnormal) Collected:  06/10/19 0436    Specimen:  Blood Updated:  06/10/19 0936     WBC 4.98 10*3/mm3      RBC 3.41 10*6/mm3      Hemoglobin 9.7 g/dL      Hematocrit 28.7 %      MCV 84.2 fL      MCH 28.4 pg      MCHC 33.8 g/dL      RDW 12.4 %      RDW-SD 37.9 fl      MPV 11.3 fL      Platelets 179 10*3/mm3      Neutrophil % 59.3 %      Lymphocyte % 27.7 %      Monocyte % 9.8 %      Eosinophil % 2.6 %      Basophil % 0.2 %      Immature Grans % 0.4 %      Neutrophils, Absolute 2.95 10*3/mm3      Lymphocytes, Absolute 1.38 10*3/mm3      Monocytes, Absolute 0.49 10*3/mm3      Eosinophils, Absolute 0.13 10*3/mm3      Basophils, Absolute 0.01 10*3/mm3      Immature Grans, Absolute 0.02 10*3/mm3      nRBC 0.0 /100 WBC     Hemoglobin A1c [[id number]] Collected:  06/10/19 0436    Specimen:  Blood Updated:  06/10/19 0729     Hemoglobin A1C 5.6 %     Narrative:       Less than 6.0           Non-Diabetic Range  6.0-7.0                 ADA Therapeutic Target  Greater than 7.0        Action Suggested    Lipid Panel [[id number]]  (Abnormal) Collected:  06/10/19 0436    Specimen:  Blood Updated:  06/10/19 0546     Total Cholesterol 112 mg/dL      Triglycerides 216 mg/dL      HDL Cholesterol 14 mg/dL      LDL Cholesterol  63 mg/dL      LDL/HDL Ratio 3.91    POC Glucose Once [[id number]]  (Normal) Collected:  06/10/19 0009    Specimen:  Blood Updated:  06/10/19 0027     Glucose 85 mg/dL      Comment: : [id number] Hickman VickyMeter ID: [id number]       Urinalysis With Culture If Indicated - Urine, Clean Catch [[id number]]  (Normal) Collected:  06/09/19 2355    Specimen:  Urine, Clean Catch Updated:  06/10/19 0006     Color, UA Yellow     Appearance, UA Clear     pH, UA <=5.0     Specific Gravity, UA 1.014     Glucose, UA Negative     Ketones, UA Negative     Bilirubin, UA Negative     Blood, UA Negative     Protein, UA Negative      Leuk Esterase, UA Negative     Nitrite, UA Negative     Urobilinogen, UA 0.2 E.U./dL    Narrative:       Urine microscopic not indicated.    [place] Draw [[id number]] Collected:  06/09/19 1151    Specimen:  Blood Updated:  06/09/19 1301    Narrative:       The following orders were created for panel order [place] Draw.  Procedure                               Abnormality         Status                     ---------                               -----------         ------                     Light Blue Top[[id number]]                                   Final result               Green Top (Gel)[[id number]]                                  Final result               Lavender Top[[id number]]                                     Final result               Red Top[[id number]]                                          Final result                 Please view results for these tests on the individual orders.    Light Blue Top [[id number]] Collected:  06/09/19 1151    Specimen:  Blood Updated:  06/09/19 1301     Extra Tube hold for add-on     Comment: Auto resulted       Green Top (Gel) [[id number]] Collected:  06/09/19 1151    Specimen:  Blood Updated:  06/09/19 1301     Extra Tube Hold for add-ons.     Comment: Auto resulted.       Lavender Top [[id number]] Collected:  06/09/19 1151    Specimen:  Blood Updated:  06/09/19 1301     Extra Tube hold for add-on     Comment: Auto resulted       Red Top [[id number]] Collected:  06/09/19 1151    Specimen:  Blood Updated:  06/09/19 1301     Extra Tube Hold for add-ons.     Comment: Auto resulted.       Troponin [[id number]]  (Normal) Collected:  06/09/19 1151    Specimen:  Blood Updated:  06/09/19 1225     Troponin I <0.012 ng/mL     CBC & Differential [[id number]] Collected:  06/09/19 1151    Specimen:  Blood Updated:  06/09/19 1224    Narrative:       The following orders were created for panel order CBC & Differential.  Procedure                               Abnormality         Status                      ---------                               -----------         ------                     CBC Auto Differential[[id number]]        Abnormal            Final result                 Please view results for these tests on the individual orders.    CBC Auto Differential [[id number]]  (Abnormal) Collected:  06/09/19 1151    Specimen:  Blood Updated:  06/09/19 1224     WBC 6.85 10*3/mm3      RBC 3.84 10*6/mm3      Hemoglobin 11.0 g/dL      Hematocrit 32.3 %      MCV 84.1 fL      MCH 28.6 pg      MCHC 34.1 g/dL      RDW 12.2 %      RDW-SD 37.3 fl      MPV 10.7 fL      Platelets 209 10*3/mm3     Narrative:       The previously reported component NRBC is no longer being reported.    Manual Differential [[id number]]  (Abnormal) Collected:  06/09/19 1151    Specimen:  Blood Updated:  06/09/19 1224     Neutrophil % 67.0 %      Lymphocyte % 13.0 %      Monocyte % 12.0 %      Eosinophil % 3.0 %      Bands %  3.0 %      Atypical Lymphocyte % 2.0 %      Neutrophils Absolute 4.80 10*3/mm3      Lymphocytes Absolute 0.89 10*3/mm3      Monocytes Absolute 0.82 10*3/mm3      Eosinophils Absolute 0.21 10*3/mm3      RBC Morphology Normal     WBC Morphology Normal     Platelet Morphology Normal    Comprehensive Metabolic Panel [[id number]]  (Abnormal) Collected:  06/09/19 1151    Specimen:  Blood Updated:  06/09/19 1212     Glucose 132 mg/dL      BUN 33 mg/dL      Creatinine 2.56 mg/dL      Sodium 139 mmol/L      Potassium 3.7 mmol/L      Chloride 105 mmol/L      CO2 20.0 mmol/L      Calcium 9.1 mg/dL      Total Protein 7.6 g/dL      Albumin 3.90 g/dL      ALT (SGPT) <15 U/L      AST (SGOT) 30 U/L      Alkaline Phosphatase 120 U/L      Total Bilirubin 0.3 mg/dL      eGFR Non African Amer 19 mL/min/1.73      Globulin 3.7 gm/dL      A/G Ratio 1.1 g/dL      BUN/Creatinine Ratio 12.9     Anion Gap 14.0 mmol/L     Narrative:       GFR Normal >60  Chronic Kidney Disease <60  Kidney Failure <15    Protime-INR [[id number]]  (Abnormal)  Collected:  06/09/19 1151    Specimen:  Blood Updated:  06/09/19 1211     Protime 19.2 Seconds      INR 1.56    aPTT [[id number]]  (Abnormal) Collected:  06/09/19 1151    Specimen:  Blood Updated:  06/09/19 1211     PTT 59.4 seconds     POC Glucose Once [[id number]]  (Abnormal) Collected:  06/09/19 1132    Specimen:  Blood Updated:  06/09/19 1145     Glucose 147 mg/dL      Comment: : [id number] Chandler Toroer ID: [id number]           Imaging Results (last 72 hours)     Procedure Component Value Units Date/Time    US Renal Bilateral [[id number]] Collected:  06/11/19 0743     Updated:  06/11/19 0748    Narrative:       HISTORY: Acute kidney injury     Bilateral renal ultrasound: Sonographic imaging the kidneys and bladder  is performed. Images of the abdominal aorta appear normal in caliber.  Proximal IVC is patent.     Kidneys appear isoechoic compared to the liver. The right kidney  measures 10.0 x 4.5 x 5.4 cm. Left kidney measures 10.9 x 5.6 x 4.3 cm.  There is no solid or cystic renal mass. There is no hydronephrosis.  There is no abnormal perinephric fluid collection.     Moderately distended bladder is unremarkable.       Impression:       1. No acute sonographic pathology of the kidneys.  This report was finalized on 06/11/2019 07:45 by Dr. Taylor Orr MD.    MRI Brain With & Without Contrast [[id number]] Collected:  06/09/19 1700     Updated:  06/09/19 1711    Narrative:       EXAM: MRI BRAIN W WO CONTRAST- - 6/9/2019 4:09 PM CDT     HISTORY: Stroke. Technologist obtained history indicates headaches,  difficulty with speech.     COMPARISON: 06/09/2019.      TECHNIQUE: Routine protocol MRI performed of the brain without and with  intravenous contrast.     FINDINGS:  No acute intracranial hemorrhage, midline shift, mass effect, or  restricted diffusion to indicate acute infarct. No abnormal enhancement.  Moderate confluent T2/FLAIR hyperintensity in the periventricular and  subcortical white matter,  nonspecific, but most commonly chronic  microvascular changes. Ventricles, cisterns and sulci are normal in size  and configuration. Sella and midline structures within normal limits.  Brainstem and posterior fossa are within normal limits.      Visualized contrasted vessels and noncontrasted flow voids are within  normal limits. Thinning of the ocular lenses may be age-related or  postoperative. Visualized retrobulbar soft tissue, paranasal sinuses,  and mastoid air cells are within normal limits. Visualized overlying  soft tissues within normal limits.          Impression:       1. No acute intracranial abnormality. No evidence of intracranial  hemorrhage, mass or acute infarction.  2. Moderate chronic microvascular changes.  3. Normal aeration of the paranasal sinuses and mastoid air cells.     This report was finalized on 06/09/2019 17:08 by Dr Lizbeth Heard MD.    XR Chest 1 View [[id number]] Collected:  06/09/19 1223     Updated:  06/09/19 1227    Narrative:       EXAM: XR CHEST 1 VW- - 6/9/2019 12:21 PM CDT     HISTORY: Acute Stroke Protocol (Onset < 12 hrs)       COMPARISON: 03/18/2016.      TECHNIQUE:  1 images.  Frontal view of the chest.     FINDINGS:    Right upper lobe opacity. No pneumothorax or pleural effusion. Cardiac  and mediastinal silhouette within normal limits. No acute bony finding.          Impression:       1. Right upper lobe opacity. In the appropriate clinical setting, this  could be seen with pneumonia. Recommend follow-up after treatment to  evaluate for resolution.  This report was finalized on 06/09/2019 12:24 by Dr Lizbeth Heard MD.    CT Head Without Contrast Stroke Protocol [[id number]] Collected:  06/09/19 1153     Updated:  06/09/19 1158    Narrative:       EXAM: CT HEAD WO CONTRAST STROKE PROTOCOL- - 6/9/2019 11:40 AM CDT     HISTORY: Stroke. Slurred speech.     COMPARISON: None.      DOSE LENGTH PRODUCT: 673 mGy cm. Automated exposure control was also  utilized to decrease  patient radiation dose.     TECHNIQUE: Unenhanced axial CT images obtained from vertex to skull  base.     FINDINGS:  No acute intracranial hemorrhage, midline shift, mass effect or large  territory cortical infarct. Ventricles, sulci, basilar cisterns are  normal in size and configuration for the patient's age. Moderate  periventricular and subcortical white matter hypodensities, most likely  due to chronic microvascular disease.     Thinning of the ocular lenses may be age-related or postoperative.  Visualized retrobulbar soft tissue within normal limits. Paranasal  sinuses, mastoid air cells and external auditory canals are within  normal limits. Calvarium appears intact. Subcutaneous tissues within  normal limits.          Impression:       1. No acute intracranial findings.  2. Probable moderate chronic microvascular disease.     Results communicated by telephone to Dr. Khanh Hogan at 11:54 AM on  06/09/2019.  This report was finalized on 06/09/2019 11:55 by Dr Lizbeth Heard MD.              Hospital Course  Patient is a 69 y.o. female presented with an initial presentation of malaise with some slurred speech as well.  She was admitted to my service in the neurology floor.  Her initial lab work was concerning for prerenal azotemia.  An infectious etiology was initially considered; however, her WBC was within normal range and she showed no signs of fever.  After 12 hours of hydration she felt back to her baseline.  An MRI Brain ruled out ischemic causes of slurred speech.  A nephrology consult was obtained and suggested pre-renal azotemia and avoidance of NSAIDS in the future.    Day of discharge exam:  General Exam:  Head:  Normal cephalic, atraumatic  HEENT:  Neck supple  Fundoscopic Exam:  No signs of disc edema  CVS:  Regular rate and rhythm.  No murmurs  Carotid Examination:  No bruits  Lungs:  Clear to auscultation  Abdomen:  Non-tender, Non-distended  Extremities:  No signs of peripheral  edema  Skin:  No rashes    Neurologic Exam:    Mental Status:    -Awake, Alert, Oriented X 3  -No word finding difficulties  -No aphasia  -No dysarthria  -Follows simple and complex commands    CN II:  Visual fields full.  Pupils equally reactive to light  CN III, IV, VI:  Extraocular Muscles full with no signs of nystagmus  CN V:  Facial sensory is symmetric with no asymmetries.  CN VII:  Facial motor symmetric  CN VIII:  Gross hearing intact bilaterally  CN IX:  Palate elevates symmetrically  CN X:  Palate elevates symmetrically  CN XI:  Shoulder shrug symmetric  CN XII:  Tongue is midline on protrusion    Motor: (strength out of 5:  1= minimal movement, 2 = movement in plane of gravity, 3 = movement against gravity, 4 = movement against some resistance, 5 = full strength)    -Right Upper Ext: Proximal: 5 Distal: 5  -Left Upper Ext: Proximal: 5 Distal: 5    -Right Lower Ext: Proximal: 5 Distal: 5  -Left Lower Ext: Proximal: 5 Distal: 5    DTR:  -Right   Bicep: 2+ Tricep: 2+ Brachioradialis: 2+   Patella: 2+ Ankle: 2+ Neg Babinski  -Left   Bicep: 2+ Tricep: 2+ Brachioradialis: 2+   Patella: 2+ Ankle: 2+ Neg Babinski    Sensory:  -Intact to light touch, pinprick, temperature, pain, and proprioception    Coordination:  -Finger to nose intact  -Heel to shin intact  -No ataxia    Gait  -No signs of ataxia  -ambulates unassisted        Discharge Disposition  Home or Self Care    Discharge Medications     Discharge Medications      Continue These Medications      Instructions Start Date   acetaminophen 325 MG tablet  Commonly known as:  TYLENOL   650 mg, Oral      carvedilol 6.25 MG tablet  Commonly known as:  COREG   Take 6.25 mg by mouth 2 times daily (with meals).        cetirizine 10 MG tablet  Commonly known as:  zyrTEC   10 mg, Oral      furosemide 20 MG tablet  Commonly known as:  LASIX   No dose, route, or frequency recorded.      gemfibrozil 600 MG tablet  Commonly known as:  LOPID   600 mg, Oral, 2 Times Daily  Before Meals      levothyroxine 88 MCG tablet  Commonly known as:  SYNTHROID, LEVOTHROID   Oral      losartan 100 MG tablet  Commonly known as:  COZAAR   No dose, route, or frequency recorded.      lovastatin 20 MG tablet  Commonly known as:  MEVACOR   No dose, route, or frequency recorded.      meloxicam 15 MG tablet  Commonly known as:  MOBIC   No dose, route, or frequency recorded.      mupirocin 2 % ointment  Commonly known as:  BACTROBAN   Topical, 3 Times Daily, intranasal      pantoprazole 40 MG EC tablet  Commonly known as:  PROTONIX   No dose, route, or frequency recorded.      PAXIL 40 MG tablet  Generic drug:  PARoxetine   Take 40 mg by mouth every morning.        vitamin C 250 MG tablet  Commonly known as:  ASCORBIC ACID   250 mg, Oral      Vitamin D 2000 units capsule   Oral         Stop These Medications    FLUoxetine 20 MG capsule  Commonly known as:  PROzac            Discharge Diet:   Regular  Activity at Discharge:   Slowly resume normal activity  Follow-up Appointments  No future appointments.  Follow up with MONIE Mario in one week.  Further discussions at that time over continued use of NSAIDs         Kiran Domínguez MD  06/11/19  11:37 AM    Time: Discharge 35 min

## 2019-06-11 NOTE — PLAN OF CARE
Problem: Patient Care Overview  Goal: Plan of Care Review  Outcome: Ongoing (interventions implemented as appropriate)    Goal: Individualization and Mutuality  Outcome: Ongoing (interventions implemented as appropriate)    Goal: Discharge Needs Assessment  Outcome: Ongoing (interventions implemented as appropriate)    Goal: Interprofessional Rounds/Family Conf  Outcome: Ongoing (interventions implemented as appropriate)      Problem: Skin Injury Risk (Adult)  Goal: Identify Related Risk Factors and Signs and Symptoms  Outcome: Ongoing (interventions implemented as appropriate)    Goal: Skin Health and Integrity  Outcome: Ongoing (interventions implemented as appropriate)      Problem: Fall Risk (Adult)  Goal: Absence of Fall  Outcome: Ongoing (interventions implemented as appropriate)

## 2019-12-20 ENCOUNTER — APPOINTMENT (OUTPATIENT)
Dept: GENERAL RADIOLOGY | Facility: HOSPITAL | Age: 69
End: 2019-12-20

## 2019-12-20 ENCOUNTER — HOSPITAL ENCOUNTER (EMERGENCY)
Facility: HOSPITAL | Age: 69
Discharge: HOME | End: 2019-12-20
Admitting: EMERGENCY MEDICINE

## 2019-12-20 VITALS
TEMPERATURE: 98 F | OXYGEN SATURATION: 99 % | DIASTOLIC BLOOD PRESSURE: 74 MMHG | HEIGHT: 70 IN | SYSTOLIC BLOOD PRESSURE: 150 MMHG | BODY MASS INDEX: 29.2 KG/M2 | WEIGHT: 204 LBS | HEART RATE: 76 BPM | RESPIRATION RATE: 20 BRPM

## 2019-12-20 DIAGNOSIS — Z23 NEED FOR TDAP VACCINATION: ICD-10-CM

## 2019-12-20 DIAGNOSIS — S61.012A LACERATION OF LEFT THUMB WITHOUT FOREIGN BODY WITHOUT DAMAGE TO NAIL, INITIAL ENCOUNTER: Primary | ICD-10-CM

## 2019-12-20 PROCEDURE — 99283 EMERGENCY DEPT VISIT LOW MDM: CPT

## 2019-12-20 PROCEDURE — 25010000002 TDAP 5-2.5-18.5 LF-MCG/0.5 SUSPENSION: Performed by: PHYSICIAN ASSISTANT

## 2019-12-20 PROCEDURE — 90715 TDAP VACCINE 7 YRS/> IM: CPT | Performed by: PHYSICIAN ASSISTANT

## 2019-12-20 PROCEDURE — 90471 IMMUNIZATION ADMIN: CPT | Performed by: PHYSICIAN ASSISTANT

## 2019-12-20 PROCEDURE — 73140 X-RAY EXAM OF FINGER(S): CPT

## 2019-12-20 PROCEDURE — 96372 THER/PROPH/DIAG INJ SC/IM: CPT

## 2019-12-20 PROCEDURE — 25010000002 KETOROLAC TROMETHAMINE PER 15 MG: Performed by: PHYSICIAN ASSISTANT

## 2019-12-20 PROCEDURE — 25010000003 LIDOCAINE 1 % SOLUTION: Performed by: PHYSICIAN ASSISTANT

## 2019-12-20 PROCEDURE — 96374 THER/PROPH/DIAG INJ IV PUSH: CPT

## 2019-12-20 RX ORDER — CLINDAMYCIN HYDROCHLORIDE 300 MG/1
300 CAPSULE ORAL 3 TIMES DAILY
Qty: 30 CAPSULE | Refills: 0 | Status: SHIPPED | OUTPATIENT
Start: 2019-12-20 | End: 2019-12-30

## 2019-12-20 RX ORDER — LIDOCAINE HYDROCHLORIDE 10 MG/ML
10 INJECTION, SOLUTION INFILTRATION; PERINEURAL ONCE
Status: COMPLETED | OUTPATIENT
Start: 2019-12-20 | End: 2019-12-20

## 2019-12-20 RX ORDER — KETOROLAC TROMETHAMINE 30 MG/ML
30 INJECTION, SOLUTION INTRAMUSCULAR; INTRAVENOUS ONCE
Status: COMPLETED | OUTPATIENT
Start: 2019-12-20 | End: 2019-12-20

## 2019-12-20 RX ADMIN — LIDOCAINE HYDROCHLORIDE 10 ML: 10 INJECTION, SOLUTION INFILTRATION; PERINEURAL at 16:35

## 2019-12-20 RX ADMIN — KETOROLAC TROMETHAMINE 30 MG: 30 INJECTION, SOLUTION INTRAMUSCULAR at 15:54

## 2019-12-20 RX ADMIN — TETANUS TOXOID, REDUCED DIPHTHERIA TOXOID AND ACELLULAR PERTUSSIS VACCINE, ADSORBED 0.5 ML: 5; 2.5; 8; 8; 2.5 SUSPENSION INTRAMUSCULAR at 15:54

## 2019-12-20 NOTE — DISCHARGE INSTRUCTIONS
Please follow-up with your primary care doctor or return to any emergency room or urgent care in 5 days for wound reevaluation and suture removal.  It is important that you keep your wound dry for the next 24 hours.  After that you may take the bandage off and clean it with warm water and soap.  Please keep a Band-Aid covering your wound while at work or at times when it could become contaminated, but when at home please let it air out.  Today you were given a tetanus shot, please note this in your records as you will not need another one for the next 5 to 7 years.  Please complete the antibiotic prescription given to you today in its entirety, even if you feel better.  Please watch for signs of infection surrounding the laceration including: Spreading redness to your skin, swelling, increased pain, discharge from the laceration, or fevers.  You may use Motrin, Aleve, or Tylenol at home to help with the associated soreness.        Laceration Care, Adult  A laceration is a cut that may go through all layers of the skin. The cut may also go into the tissue that is right under the skin. Some cuts heal on their own. Others need to be closed with stitches (sutures), staples, skin adhesive strips, or skin glue. Taking care of your injury lowers your risk of infection, helps your injury to heal better, and may prevent scarring.  Supplies needed:  · Soap.  · Water.  · Hand .  · Bandage (dressing).  · Antibiotic ointment.  · Clean towel.  How to take care of your cut  Wash your hands with soap and water before touching your wound or changing your bandage. If soap and water are not available, use hand .  If your doctor used stitches or staples:  · Keep the wound clean and dry.  · If you were given a bandage, change it at least once a day as told by your doctor. You should also change it if it gets wet or dirty.  · Keep the wound completely dry for the first 24 hours, or as told by your doctor. After that,  you may take a shower or a bath. Do not get the wound soaked in water until after the stitches or staples have been removed.  · Clean the wound once a day, or as told by your doctor:  ? Wash the wound with soap and water.  ? Rinse the wound with water to remove all soap.  ? Pat the wound dry with a clean towel. Do not rub the wound.  · After you clean the wound, put a thin layer of antibiotic ointment on it as told by your doctor. This ointment:  ? Helps to prevent infection.  ? Keeps the bandage from sticking to the wound.  · Have your stitches or staples removed as told by your doctor.  If your doctor used skin adhesive strips:  · Keep the wound clean and dry.  · If you were given a bandage, you should change it at least once a day as told by your doctor. You should also change it if it gets wet or dirty.  · Do not get the skin adhesive strips wet. You can take a shower or a bath, but keep the wound dry.  · If the wound gets wet, pat it dry with a clean towel. Do not rub the wound.  · Skin adhesive strips fall off on their own. You can trim the strips as the wound heals. Do not remove any strips that are still stuck to the wound. They will fall off after a while.  If your doctor used skin glue:  · Try to keep your wound dry, but you may briefly wet it in the shower or bath. Do not soak the wound in water, such as by swimming.  · After you take a shower or a bath, gently pat the wound dry with a clean towel. Do not rub the wound.  · Do not do any activities that will make you really sweaty until the skin glue has fallen off on its own.  · Do not apply liquid, cream, or ointment medicine to your wound while the skin glue is still on.  · If you were given a bandage, you should change it at least once a day or as told by your doctor. You should also change it if it gets dirty or wet.  · If a bandage is placed over the wound, do not let the tape touch the skin glue.  · Do not pick at the glue. The skin glue usually  stays on for 5-10 days. Then, it falls off the skin.  General instructions    · Take over-the-counter and prescription medicines only as told by your doctor.  · If you were given antibiotic medicine or ointment, take or apply it as told by your doctor. Do not stop using it even if your condition improves.  · Do not scratch or pick at the wound.  · Check your wound every day for signs of infection. Watch for:  ? Redness, swelling, or pain.  ? Fluid, blood, or pus.  · Raise (elevate) the injured area above the level of your heart while you are sitting or lying down.  · If directed, put ice on the affected area:  ? Put ice in a plastic bag.  ? Place a towel between your skin and the bag.  ? Leave the ice on for 20 minutes, 2-3 times a day.  · Prevent scarring by covering your wound with sunscreen of at least 30 SPF whenever you are outside after your wound has healed.  · Keep all follow-up visits as told by your doctor. This is important.  Get help if:  · You got a tetanus shot and you have any of these problems at the injection site:  ? Swelling.  ? Very bad pain.  ? Redness.  ? Bleeding.  · You have a fever.  · A wound that was closed breaks open.  · You notice a bad smell coming from your wound or your bandage.  · You notice something coming out of the wound, such as wood or glass.  · Medicine does not relieve your pain.  · You have more redness, swelling, or pain at the site of your wound.  · You have fluid, blood, or pus coming from your wound.  · You notice a change in the color of your skin near your wound.  · You need to change the bandage often because fluid, blood, or pus is coming from the wound.  · You start to have a new rash.  · You start to have numbness around the wound.  Get help right away if:  · You have very bad swelling around the wound.  · Your pain suddenly gets worse and is very bad.  · You notice painful lumps near the wound or anywhere on your body.  · You have a red streak going away from  your wound.  · The wound is on your hand or foot, and:  ? You cannot move a finger or toe.  ? Your fingers or toes look pale or bluish.  Summary  · A laceration is a cut that may go through all layers of the skin. The cut may also go into the tissue right under the skin.  · Some cuts heal on their own. Others need to be closed with stitches, staples, skin adhesive strips, or skin glue.  · Follow your doctor's instructions for caring for your cut. Proper care of a cut lowers the risk of infection, helps the cut heal better, and prevents scarring.  This information is not intended to replace advice given to you by your health care provider. Make sure you discuss any questions you have with your health care provider.  Document Released: 06/05/2009 Document Revised: 01/07/2019 Document Reviewed: 01/07/2019  Elsevier Interactive Patient Education © 2019 [employer]

## 2019-12-20 NOTE — ED PROVIDER NOTES
"Subjective   History of Present Illness    Patient is a 69-year-old female presenting to ED with left thumb laceration.  Patient reported she is right-hand dominant.  Patient stated she was cutting open a glued box, holding a  in her right hand.  Patient describes she was cutting towards herself when she slipped after applying too much pressure, lacerating the back of her left thumb in a linear manner.  Patient noted she was able to control the bleeding on her own at home and then [facility] where [facility] advised she come to ED for further evaluation.  Patient reported soreness associated around the laceration but denies any weakness, numbness, or neuro deficits of the left upper extremity or thumb.  Patient denies any other injuries.  Patient denies any medication use prior to arrival and noted her last tetanus shot \"may have been\" 7 or more years ago.  Patient denies any other injuries.  Patient denies any blood thinner use including daily aspirin.    Review of Systems   Constitutional: Negative.  Negative for chills, diaphoresis and fever.   HENT: Negative.  Negative for congestion, sinus pressure and sore throat.    Respiratory: Negative.  Negative for cough and shortness of breath.    Cardiovascular: Negative.  Negative for chest pain.   Gastrointestinal: Negative.  Negative for nausea and vomiting.   Genitourinary: Negative.    Musculoskeletal: Negative.  Negative for arthralgias, joint swelling and myalgias.   Skin: Positive for wound (left thumb laceration). Negative for rash.   Neurological: Negative.  Negative for weakness, numbness and headaches.   Psychiatric/Behavioral: Negative.    All other systems reviewed and are negative.      Past Medical History:   Diagnosis Date   • Chronic laryngitis 12/4/2017   • Chronic rhinitis 12/4/2017   • Depression    • Goiter 12/4/2017   • High cholesterol    • Hypertension    • Hypothyroidism    • LPRD (laryngopharyngeal reflux disease) 12/4/2017 "   • Postoperative hypothyroidism 12/4/2017       Allergies   Allergen Reactions   • Ace Inhibitors    • Amlodipine Besy-Benazepril Hcl    • Aspirin    • B12 Folate  [Folic Acid-Vit B6-Vit B12]    • Codeine    • Demerol [Meperidine]    • Detrol  [Tolterodine Tartrate]    • Latex    • Morphine And Related    • Penicillins    • Sulfa Antibiotics        Past Surgical History:   Procedure Laterality Date   • BLADDER SURGERY      sling   • HYSTERECTOMY     • THYROIDECTOMY, PARTIAL     • THYROIDECTOMY, PARTIAL Left    • TONSILLECTOMY     • TONSILLECTOMY         Family History   Problem Relation Age of Onset   • No Known Problems Father    • No Known Problems Mother    • Kidney cancer Sister    • Prostate cancer Brother        Social History     Socioeconomic History   • Marital status:      Spouse name: Not on file   • Number of children: Not on file   • Years of education: Not on file   • Highest education level: Not on file   Tobacco Use   • Smoking status: Never Smoker   • Smokeless tobacco: Never Used   Substance and Sexual Activity   • Alcohol use: No   • Drug use: No           Objective   Physical Exam   Constitutional: She is oriented to person, place, and time. She appears well-developed and well-nourished. No distress.   HENT:   Head: Normocephalic and atraumatic.   Right Ear: External ear normal.   Left Ear: External ear normal.   Nose: Nose normal.   Mouth/Throat: Oropharynx is clear and moist.   Eyes: Pupils are equal, round, and reactive to light. Conjunctivae and EOM are normal. Right eye exhibits no discharge. Left eye exhibits no discharge. No scleral icterus.   Neck: Normal range of motion. Neck supple.   Cardiovascular: Normal rate, regular rhythm, normal heart sounds and intact distal pulses.   No murmur heard.  Pulses:       Radial pulses are 2+ on the right side, and 2+ on the left side.   Pulmonary/Chest: Effort normal and breath sounds normal. No respiratory distress. She has no wheezes. She  has no rales. She exhibits no tenderness.   Abdominal: Soft. Bowel sounds are normal. There is no tenderness.   Musculoskeletal: Normal range of motion. She exhibits no edema or deformity.        Left hand: She exhibits laceration. She exhibits normal range of motion and normal capillary refill. Normal sensation noted. Normal strength noted. She exhibits no finger abduction, no thumb/finger opposition and no wrist extension trouble.        Hands:  4 cm linear laceration to the dorsum of the left thumb which crosses the DIP joint. Bleeding well controlled. Minimal surrounding tenderness with no surrounding erythema or ecchymosis.    Neurological: She is alert and oriented to person, place, and time. She has normal strength. No sensory deficit. Gait normal.   Skin: Skin is warm and dry. Capillary refill takes less than 2 seconds. Laceration (left thumb) noted. No rash noted.   Psychiatric: She has a normal mood and affect. Her behavior is normal. Judgment and thought content normal.   Nursing note and vitals reviewed.      Laceration Repair  Date/Time: 12/20/2019 4:23 PM  Performed by: Duong Ricks PA-C  Authorized by: Duong Ricks PA-C     Consent:     Consent obtained:  Verbal    Consent given by:  Patient    Risks discussed:  Infection, need for additional repair, nerve damage, pain, poor cosmetic result, poor wound healing, retained foreign body, tendon damage and vascular damage    Alternatives discussed:  Observation, delayed treatment and no treatment  Anesthesia (see MAR for exact dosages):     Anesthesia method:  Local infiltration    Local anesthetic:  Lidocaine 1% w/o epi  Laceration details:     Location:  Finger    Finger location:  L thumb    Length (cm):  4  Repair type:     Repair type:  Simple  Pre-procedure details:     Preparation:  Patient was prepped and draped in usual sterile fashion and imaging obtained to evaluate for foreign bodies  Exploration:     Hemostasis achieved with:  Direct  pressure    Wound exploration: wound explored through full range of motion and entire depth of wound probed and visualized      Wound extent: no foreign bodies/material noted and no underlying fracture noted    Treatment:     Area cleansed with:  Betadine, saline and Shur-Clens    Amount of cleaning:  Extensive    Irrigation solution:  Sterile saline  Skin repair:     Repair method:  Sutures    Suture size:  5-0    Suture material:  Nylon    Suture technique:  Simple interrupted    Number of sutures:  10  Approximation:     Approximation:  Close  Post-procedure details:     Dressing:  Antibiotic ointment and adhesive bandage    Patient tolerance of procedure:  Tolerated well, no immediate complications               ED Course  ED Course as of Dec 21 0356   Fri Dec 20, 2019   1610 Patient resting comfortably in chair without any complaints at this time.  Reviewed with patient x-ray findings and ability to repair her laceration at this time.  Patient amenable to continued treatment plan without any further questions or concerns.    [JS]   1655 After wound repair discussed with patient wound cleaning.  Discussed need to keep wound clean and dry for the next 24 hours.  Discussed ability to use warm water and light soap to gently clean the area after that.  Discussed signs of infection including swelling, erythema, pain out of proportion, and other neurological deficits.  Discussed need for PCP follow-up in 5 to 7 days for suture removal.  Discussed ability to return to any urgent care or this ED as well for suture removal.  Discussed ability to use over-the-counter medications such as Motrin and Tylenol for pain relief.  Discussed with patient that she was given the tetanus shot today which is now good for the next 5 to 7 years and the need to write this down in her records.  Patient without any further questions, concerns, or needs at this time.  Patient is now stable for discharge.    [JS]      ED Course User  Index  [JS] Duong Ricks PA-C                      No data recorded                        MDM  Number of Diagnoses or Management Options  Laceration of left thumb without foreign body without damage to nail, initial encounter:   Need for Tdap vaccination:      Amount and/or Complexity of Data Reviewed  Tests in the radiology section of CPT®: ordered and reviewed    Patient Progress  Patient progress: improved      Final diagnoses:   Laceration of left thumb without foreign body without damage to nail, initial encounter   Need for Tdap vaccination              Duong Ricks PA-C  12/21/19 0356

## 2020-01-05 ENCOUNTER — HOSPITAL ENCOUNTER (EMERGENCY)
Facility: HOSPITAL | Age: 70
Discharge: HOME | End: 2020-01-05

## 2020-01-05 VITALS
DIASTOLIC BLOOD PRESSURE: 52 MMHG | BODY MASS INDEX: 29.49 KG/M2 | WEIGHT: 206 LBS | RESPIRATION RATE: 16 BRPM | TEMPERATURE: 98.3 F | HEIGHT: 70 IN | OXYGEN SATURATION: 98 % | HEART RATE: 66 BPM | SYSTOLIC BLOOD PRESSURE: 177 MMHG

## 2020-01-05 DIAGNOSIS — Z48.02 VISIT FOR SUTURE REMOVAL: Primary | ICD-10-CM

## 2020-01-05 PROCEDURE — 99282 EMERGENCY DEPT VISIT SF MDM: CPT

## 2020-01-05 NOTE — ED PROVIDER NOTES
Subjective     Other   Chronicity:  New  Context:  Suture remova; here on 12/20 for laceration repair  Associated symptoms: no fever and no rash        Review of Systems   Constitutional: Negative for fever.   HENT: Negative.    Cardiovascular: Negative.    Gastrointestinal: Negative.    Musculoskeletal: Negative.    Skin: Positive for wound. Negative for rash.        Healing wound to the thumb   All other systems reviewed and are negative.      Past Medical History:   Diagnosis Date   • Chronic laryngitis 12/4/2017   • Chronic rhinitis 12/4/2017   • Depression    • Goiter 12/4/2017   • High cholesterol    • Hypertension    • Hypothyroidism    • LPRD (laryngopharyngeal reflux disease) 12/4/2017   • Postoperative hypothyroidism 12/4/2017       Allergies   Allergen Reactions   • Ace Inhibitors    • Amlodipine Besy-Benazepril Hcl    • Aspirin    • B12 Folate  [Folic Acid-Vit B6-Vit B12]    • Codeine    • Demerol [Meperidine]    • Detrol  [Tolterodine Tartrate]    • Latex    • Morphine And Related    • Penicillins    • Sulfa Antibiotics        Past Surgical History:   Procedure Laterality Date   • BLADDER SURGERY      sling   • HYSTERECTOMY     • THYROIDECTOMY, PARTIAL     • THYROIDECTOMY, PARTIAL Left    • TONSILLECTOMY     • TONSILLECTOMY         Family History   Problem Relation Age of Onset   • No Known Problems Father    • No Known Problems Mother    • Kidney cancer Sister    • Prostate cancer Brother        Social History     Socioeconomic History   • Marital status:      Spouse name: Not on file   • Number of children: Not on file   • Years of education: Not on file   • Highest education level: Not on file   Tobacco Use   • Smoking status: Never Smoker   • Smokeless tobacco: Never Used   Substance and Sexual Activity   • Alcohol use: No   • Drug use: No           Objective   Physical Exam   Constitutional: She is oriented to person, place, and time. She appears well-developed and well-nourished.   HENT:    Head: Normocephalic and atraumatic.   Right Ear: External ear normal.   Left Ear: External ear normal.   Nose: Nose normal.   Eyes: Pupils are equal, round, and reactive to light. Conjunctivae and EOM are normal.   Neck: Normal range of motion. Neck supple.   Cardiovascular: Normal rate, regular rhythm, normal heart sounds and intact distal pulses.   Pulmonary/Chest: Effort normal and breath sounds normal.   Abdominal: Soft. Bowel sounds are normal.   Musculoskeletal: Normal range of motion.   Neurological: She is alert and oriented to person, place, and time.   Skin: Skin is warm and dry. Capillary refill takes less than 2 seconds.   Sutures noted to the left thumb without drainage noted; well approximated; rom intact, neurovascular intact   Psychiatric: She has a normal mood and affect. Her behavior is normal. Judgment and thought content normal.   Nursing note and vitals reviewed.      Procedures           ED Course                                               MDM  Number of Diagnoses or Management Options  Visit for suture removal: new and does not require workup     Amount and/or Complexity of Data Reviewed  Discuss the patient with other providers: yes    Risk of Complications, Morbidity, and/or Mortality  Presenting problems: minimal  Diagnostic procedures: minimal  Management options: minimal    Patient Progress  Patient progress: improved      Final diagnoses:   Visit for suture removal            Jaclyn Nava, APRN  01/05/20 [id number]

## 2020-01-05 NOTE — ED NOTES
Patient had stitches placed in the left thumb on the 12/10 and reports today is the first opportunity she has had to get them removed;  Area looks almost healed and healthy.     Arti Smiley, RN  01/05/20 9704

## 2020-09-25 NOTE — PROGRESS NOTES
Ms. Gee is 70 y.o. female    CHIEF COMPLAINT: 2 year follow up for Atrophic Vaganitis    HPI  He is followed atrophic vaginitis.  This is been present for about 15 years but is managed successfully with Premarin intravaginally.  Her course was complicated by protrusion of a TVT sling which is since been closed by flap.  The patient is done well.  She has had recurrence of her stress incontinence unfortunately after this procedure.  It is not as bad as it was prior to the procedure.      The following portions of the patient's history were reviewed and updated as appropriate: allergies, current medications, past family history, past medical history, past social history, past surgical history and problem list.      Review of Systems   Constitutional: Negative for chills and fever.   Gastrointestinal: Negative for abdominal pain, anal bleeding and blood in stool.   Genitourinary: Negative for dysuria, frequency, hematuria and urgency.         Current Outpatient Medications:   •  acetaminophen (TYLENOL) 325 MG tablet, Take 650 mg by mouth., Disp: , Rfl:   •  carvedilol (COREG) 6.25 MG tablet, Take 6.25 mg by mouth 2 times daily (with meals).  , Disp: , Rfl:   •  cetirizine (zyrTEC) 10 MG tablet, Take 10 mg by mouth., Disp: , Rfl:   •  Cholecalciferol (VITAMIN D) 2000 units capsule, Take  by mouth., Disp: , Rfl:   •  furosemide (LASIX) 20 MG tablet, , Disp: , Rfl:   •  gemfibrozil (LOPID) 600 MG tablet, Take 600 mg by mouth 2 (Two) Times a Day Before Meals., Disp: , Rfl:   •  levothyroxine (SYNTHROID, LEVOTHROID) 88 MCG tablet, Take  by mouth., Disp: , Rfl:   •  losartan (COZAAR) 100 MG tablet, , Disp: , Rfl:   •  lovastatin (MEVACOR) 20 MG tablet, , Disp: , Rfl: 5  •  meloxicam (MOBIC) 15 MG tablet, , Disp: , Rfl: 5  •  mupirocin (BACTROBAN) 2 % ointment, Apply  topically 3 (Three) Times a Day. intranasal, Disp: 22 g, Rfl: 0  •  pantoprazole (PROTONIX) 40 MG EC tablet, , Disp: , Rfl:   •  PARoxetine (PAXIL) 40 MG  tablet, Take 40 mg by mouth every morning.  , Disp: , Rfl:   •  vitamin C (ASCORBIC ACID) 250 MG tablet, Take 250 mg by mouth., Disp: , Rfl:     Past Medical History:   Diagnosis Date   • Chronic laryngitis 12/4/2017   • Chronic rhinitis 12/4/2017   • Depression    • Goiter 12/4/2017   • High cholesterol    • Hypertension    • Hypothyroidism    • LPRD (laryngopharyngeal reflux disease) 12/4/2017   • Postoperative hypothyroidism 12/4/2017       Past Surgical History:   Procedure Laterality Date   • BLADDER SURGERY      sling   • HYSTERECTOMY     • THYROIDECTOMY, PARTIAL     • THYROIDECTOMY, PARTIAL Left    • TONSILLECTOMY     • TONSILLECTOMY         Social History     Socioeconomic History   • Marital status:      Spouse name: Not on file   • Number of children: Not on file   • Years of education: Not on file   • Highest education level: Not on file   Tobacco Use   • Smoking status: Never Smoker   • Smokeless tobacco: Never Used   Substance and Sexual Activity   • Alcohol use: No   • Drug use: No       Family History   Problem Relation Age of Onset   • No Known Problems Father    • No Known Problems Mother    • Kidney cancer Sister    • Prostate cancer Brother          There were no vitals taken for this visit.      Physical Exam  Constitutional: Well nourished, Well developed; No apparent distress, her vital signs are reviewed  Psychiatric: Appropriate affect; Alert and oriented  Eyes: Unremarkable  Musculoskeletal: Normal gait and station  GI: Abdomen is soft, non-tender  Respiratory: No distress; Unlabored movement; No accessory musculature needed with symmetric movements  Skin: No pallor or diaphoresis  : Pale, smooth, shiny vaginal epithelium with significant decrease in rugae. Diminished elasticity and turgor of skin noted.  A cystocele is not apparent.       Data  Results for orders placed or performed during the hospital encounter of 06/09/19   Comprehensive Metabolic Panel    Specimen: Blood    Result Value Ref Range    Glucose 132 (H) 70 - 100 mg/dL    BUN 33 (H) 5 - 21 mg/dL    Creatinine 2.56 (H) 0.50 - 1.40 mg/dL    Sodium 139 135 - 145 mmol/L    Potassium 3.7 3.5 - 5.3 mmol/L    Chloride 105 98 - 110 mmol/L    CO2 20.0 (L) 24.0 - 31.0 mmol/L    Calcium 9.1 8.4 - 10.4 mg/dL    Total Protein 7.6 6.3 - 8.7 g/dL    Albumin 3.90 3.50 - 5.00 g/dL    ALT (SGPT) <15 0 - 54 U/L    AST (SGOT) 30 7 - 45 U/L    Alkaline Phosphatase 120 24 - 120 U/L    Total Bilirubin 0.3 0.1 - 1.0 mg/dL    eGFR Non African Amer 19 (L) >60 mL/min/1.73    Globulin 3.7 gm/dL    A/G Ratio 1.1 1.1 - 2.5 g/dL    BUN/Creatinine Ratio 12.9 7.0 - 25.0    Anion Gap 14.0 (H) 4.0 - 13.0 mmol/L   Protime-INR    Specimen: Blood   Result Value Ref Range    Protime 19.2 (H) 11.9 - 14.6 Seconds    INR 1.56 (H) 0.91 - 1.09   aPTT    Specimen: Blood   Result Value Ref Range    PTT 59.4 (H) 24.1 - 35.0 seconds   Troponin    Specimen: Blood   Result Value Ref Range    Troponin I <0.012 0.000 - 0.034 ng/mL   CBC Auto Differential    Specimen: Blood   Result Value Ref Range    WBC 6.85 4.80 - 10.80 10*3/mm3    RBC 3.84 (L) 4.20 - 5.40 10*6/mm3    Hemoglobin 11.0 (L) 12.0 - 16.0 g/dL    Hematocrit 32.3 (L) 37.0 - 47.0 %    MCV 84.1 82.0 - 98.0 fL    MCH 28.6 28.0 - 32.0 pg    MCHC 34.1 33.0 - 36.0 g/dL    RDW 12.2 12.0 - 15.0 %    RDW-SD 37.3 (L) 40.0 - 54.0 fl    MPV 10.7 6.0 - 12.0 fL    Platelets 209 130 - 400 10*3/mm3   Urinalysis With Culture If Indicated - Urine, Clean Catch    Specimen: Urine, Clean Catch   Result Value Ref Range    Color, UA Yellow Yellow, Straw    Appearance, UA Clear Clear    pH, UA <=5.0 5.0 - 8.0    Specific Gravity, UA 1.014 1.005 - 1.030    Glucose, UA Negative Negative    Ketones, UA Negative Negative    Bilirubin, UA Negative Negative    Blood, UA Negative Negative    Protein, UA Negative Negative    Leuk Esterase, UA Negative Negative    Nitrite, UA Negative Negative    Urobilinogen, UA 0.2 E.U./dL 0.2 - 1.0 E.U./dL    Hemoglobin A1c    Specimen: Blood   Result Value Ref Range    Hemoglobin A1C 5.6 %   Lipid Panel    Specimen: Blood   Result Value Ref Range    Total Cholesterol 112 (L) 130 - 200 mg/dL    Triglycerides 216 (H) 0 - 149 mg/dL    HDL Cholesterol 14 (L) >=50 mg/dL    LDL Cholesterol  63 0 - 99 mg/dL    LDL/HDL Ratio 3.91    Basic Metabolic Panel    Specimen: Blood   Result Value Ref Range    Glucose 83 70 - 100 mg/dL    BUN 30 (H) 5 - 21 mg/dL    Creatinine 1.70 (H) 0.50 - 1.40 mg/dL    Sodium 141 135 - 145 mmol/L    Potassium 3.8 3.5 - 5.3 mmol/L    Chloride 112 (H) 98 - 110 mmol/L    CO2 19.0 (L) 24.0 - 31.0 mmol/L    Calcium 8.5 8.4 - 10.4 mg/dL    eGFR Non African Amer 30 (L) >60 mL/min/1.73    BUN/Creatinine Ratio 17.6 7.0 - 25.0    Anion Gap 10.0 4.0 - 13.0 mmol/L   CBC Auto Differential    Specimen: Blood   Result Value Ref Range    WBC 4.98 4.80 - 10.80 10*3/mm3    RBC 3.41 (L) 4.20 - 5.40 10*6/mm3    Hemoglobin 9.7 (L) 12.0 - 16.0 g/dL    Hematocrit 28.7 (L) 37.0 - 47.0 %    MCV 84.2 82.0 - 98.0 fL    MCH 28.4 28.0 - 32.0 pg    MCHC 33.8 33.0 - 36.0 g/dL    RDW 12.4 12.0 - 15.0 %    RDW-SD 37.9 (L) 40.0 - 54.0 fl    MPV 11.3 6.0 - 12.0 fL    Platelets 179 130 - 400 10*3/mm3    Neutrophil % 59.3 39.0 - 78.0 %    Lymphocyte % 27.7 15.0 - 45.0 %    Monocyte % 9.8 4.0 - 12.0 %    Eosinophil % 2.6 0.0 - 4.0 %    Basophil % 0.2 0.0 - 2.0 %    Immature Grans % 0.4 0.0 - 5.0 %    Neutrophils, Absolute 2.95 1.87 - 8.40 10*3/mm3    Lymphocytes, Absolute 1.38 0.72 - 4.86 10*3/mm3    Monocytes, Absolute 0.49 0.19 - 1.30 10*3/mm3    Eosinophils, Absolute 0.13 0.00 - 0.70 10*3/mm3    Basophils, Absolute 0.01 0.00 - 0.20 10*3/mm3    Immature Grans, Absolute 0.02 0.00 - 0.05 10*3/mm3    nRBC 0.0 0.0 - 0.2 /100 WBC   Sodium, Urine, Random - Urine, Clean Catch    Specimen: Urine, Clean Catch   Result Value Ref Range    Sodium, Urine 98 (H) 30 - 90 mmol/L   Creatinine, Urine, Random - Urine, Clean Catch    Specimen: Urine,  Clean Catch   Result Value Ref Range    Creatinine, Urine 52.7 mg/dL   Urea Nitrogen, Urine - Urine, Clean Catch    Specimen: Urine, Clean Catch   Result Value Ref Range    Urea Nitrogen, Urine 364 mg/dL   PTH, Intact    Specimen: Blood   Result Value Ref Range    PTH, Intact 95.1 (H) 7.5 - 53.5 pg/mL   Blood Gas, Arterial    Specimen: Arterial Blood   Result Value Ref Range    Site Right Radial     Michael's Test Positive     pH, Arterial 7.356 7.350 - 7.450 pH units    pCO2, Arterial 31.9 (L) 35.0 - 45.0 mm Hg    pO2, Arterial 93.1 83.0 - 108.0 mm Hg    HCO3, Arterial 17.8 (L) 20.0 - 26.0 mmol/L    Base Excess, Arterial -6.8 (L) 0.0 - 2.0 mmol/L    O2 Saturation, Arterial 97.4 94.0 - 99.0 %    Temperature 37.0 C    Barometric Pressure for Blood Gas 754 mmHg    Modality Room Air     Ventilator Mode NA     Collected by [id number]    Uric Acid    Specimen: Blood   Result Value Ref Range    Uric Acid 5.5 2.7 - 7.5 mg/dL   Basic Metabolic Panel    Specimen: Blood   Result Value Ref Range    Glucose 94 70 - 100 mg/dL    BUN 26 (H) 5 - 21 mg/dL    Creatinine 1.50 (H) 0.50 - 1.40 mg/dL    Sodium 141 135 - 145 mmol/L    Potassium 4.0 3.5 - 5.3 mmol/L    Chloride 113 (H) 98 - 110 mmol/L    CO2 19.0 (L) 24.0 - 31.0 mmol/L    Calcium 8.3 (L) 8.4 - 10.4 mg/dL    eGFR Non African Amer 35 (L) >60 mL/min/1.73    BUN/Creatinine Ratio 17.3 7.0 - 25.0    Anion Gap 9.0 4.0 - 13.0 mmol/L   POC Glucose Once    Specimen: Blood   Result Value Ref Range    Glucose 147 (H) 70 - 130 mg/dL   POC Glucose Once    Specimen: Blood   Result Value Ref Range    Glucose 85 70 - 130 mg/dL   POC Glucose Once    Specimen: Blood   Result Value Ref Range    Glucose 170 (H) 70 - 130 mg/dL   Adult Transthoracic Echo Complete W/ Cont if Necessary Per Protocol (With Agitated Saline)   Result Value Ref Range    BSA 2.0 m^2    IVSd 0.95 cm    LVIDd 5.0 cm    LVIDs 3.5 cm    LVPWd 1.5 cm    IVS/LVPW 0.64     FS 29.3 %    EDV(Teich) 117.7 ml    ESV(Teich) 51.9 ml     EF(Teich) 55.9 %    EDV(cubed) 124.3 ml    ESV(cubed) 44.0 ml    EF(cubed) 64.6 %    LV mass(C)d 240.3 grams    LV mass(C)dI 117.7 grams/m^2    SV(Teich) 65.8 ml    SI(Teich) 32.2 ml/m^2    SV(cubed) 80.3 ml    SI(cubed) 39.3 ml/m^2    Ao root diam 2.7 cm    Ao root area 5.7 cm^2    LA dimension 4.3 cm    LA/Ao 1.6     LVOT diam 2.1 cm    LVOT area 3.5 cm^2    LVOT area(traced) 3.5 cm^2    LVLd ap4 8.6 cm    EDV(MOD-sp4) 171.0 ml    LVLs ap4 6.2 cm    ESV(MOD-sp4) 53.5 ml    EF(MOD-sp4) 68.7 %    SV(MOD-sp4) 117.5 ml    SI(MOD-sp4) 57.5 ml/m^2    Ao root area (BSA corrected) 1.3     LV Spivey Vol (BSA corrected) 83.7 ml/m^2    LV Sys Vol (BSA corrected) 26.2 ml/m^2    MV E max pancho 137.0 cm/sec    MV A max pancho 114.0 cm/sec    MV E/A 1.2     MV dec slope 374.0 cm/sec^2    MV dec time 0.37 sec    Ao pk pancho 244.0 cm/sec    Ao max PG 23.8 mmHg    Ao max PG (full) 12.1 mmHg    Ao V2 mean 161.5 cm/sec    Ao mean PG 12.0 mmHg    Ao mean PG (full) 6.0 mmHg    Ao V2 VTI 64.1 cm    YAO(I,A) 2.7 cm^2    YAO(I,D) 2.7 cm^2    YAO(V,A) 2.4 cm^2    YAO(V,D) 2.4 cm^2    LV V1 max PG 11.7 mmHg    LV V1 mean PG 6.0 mmHg    LV V1 max 171.0 cm/sec    LV V1 mean 115.0 cm/sec    LV V1 VTI 49.5 cm    MR max pancho 383.0 cm/sec    MR max PG 58.7 mmHg    SV(Ao) 367.0 ml    SI(Ao) 179.7 ml/m^2    SV(LVOT) 171.4 ml    SI(LVOT) 83.9 ml/m^2    TR max pancho 278.0 cm/sec    RVSP(TR) 40.9 mmHg    RAP systole 10.0 mmHg     CV ECHO CAMI - BZI_BMI 27.3 kilograms/m^2     CV ECHO CAMI - BSA(HAYCOCK) 2.1 m^2     CV ECHO CAMI - BZI_METRIC_WEIGHT 86.2 kg     CV ECHO CAMI - BZI_METRIC_HEIGHT 177.8 cm    Target HR (85%) 129 bpm    Max. Pred. HR (100%) 152 bpm    LA volume 71.9 cm3    LA Volume Index 35.2 mL/m2    Avg E/e' ratio 16.02     Lat Peak E' Pancho 9.9 cm/sec    Med Peak E' Pancho 7.20 cm/sec   Type & Screen    Specimen: Blood   Result Value Ref Range    ABO Type O     RH type Positive     Antibody Screen Negative     T&S Expiration Date 6/12/2019  11:59:59 PM    Light Blue Top   Result Value Ref Range    Extra Tube hold for add-on    Green Top (Gel)   Result Value Ref Range    Extra Tube Hold for add-ons.    Lavender Top   Result Value Ref Range    Extra Tube hold for add-on    Red Top   Result Value Ref Range    Extra Tube Hold for add-ons.    Manual Differential    Specimen: Blood   Result Value Ref Range    Neutrophil % 67.0 39.0 - 78.0 %    Lymphocyte % 13.0 (L) 15.0 - 45.0 %    Monocyte % 12.0 4.0 - 12.0 %    Eosinophil % 3.0 0.0 - 4.0 %    Bands %  3.0 0.0 - 10.0 %    Atypical Lymphocyte % 2.0 0.0 - 5.0 %    Neutrophils Absolute 4.80 1.87 - 8.40 10*3/mm3    Lymphocytes Absolute 0.89 0.72 - 4.86 10*3/mm3    Monocytes Absolute 0.82 0.19 - 1.30 10*3/mm3    Eosinophils Absolute 0.21 0.00 - 0.70 10*3/mm3    RBC Morphology Normal Normal    WBC Morphology Normal Normal    Platelet Morphology Normal Normal         Imaging Results (Last 7 Days)     ** No results found for the last 168 hours. **            Assessment and Plan  Diagnoses and all orders for this visit:    Atrophic vaginitis  -     POC Urinalysis Dipstick, Multipro      -Continue vaginal estrogen replacement.          (Please note that portions of this note were completed with a voice recognition program.)  Lexie Larsen MA  09/25/20  14:16 CDT

## 2020-09-29 ENCOUNTER — OFFICE VISIT (OUTPATIENT)
Dept: UROLOGY | Facility: CLINIC | Age: 70
End: 2020-09-29

## 2020-09-29 VITALS — TEMPERATURE: 97 F | WEIGHT: 202.8 LBS | HEIGHT: 70 IN | BODY MASS INDEX: 29.03 KG/M2

## 2020-09-29 DIAGNOSIS — N95.2 ATROPHIC VAGINITIS: Primary | ICD-10-CM

## 2020-09-29 LAB
BILIRUB BLD-MCNC: NEGATIVE MG/DL
CLARITY, POC: CLEAR
COLOR UR: YELLOW
GLUCOSE UR STRIP-MCNC: NEGATIVE MG/DL
KETONES UR QL: NEGATIVE
LEUKOCYTE EST, POC: NEGATIVE
NITRITE UR-MCNC: NEGATIVE MG/ML
PH UR: 5.5 [PH] (ref 5–8)
PROT UR STRIP-MCNC: ABNORMAL MG/DL
RBC # UR STRIP: ABNORMAL /UL
SP GR UR: 1.01 (ref 1–1.03)
UROBILINOGEN UR QL: NORMAL

## 2020-09-29 PROCEDURE — 81003 URINALYSIS AUTO W/O SCOPE: CPT | Performed by: UROLOGY

## 2020-09-29 PROCEDURE — 99212 OFFICE O/P EST SF 10 MIN: CPT | Performed by: UROLOGY

## 2020-09-29 RX ORDER — DULOXETIN HYDROCHLORIDE 60 MG/1
CAPSULE, DELAYED RELEASE ORAL
COMMUNITY
Start: 2020-08-24

## 2021-05-26 RX ORDER — ALBUTEROL SULFATE 90 UG/1
2 AEROSOL, METERED RESPIRATORY (INHALATION) EVERY 6 HOURS PRN
COMMUNITY

## 2021-05-26 RX ORDER — DULOXETIN HYDROCHLORIDE 60 MG/1
60 CAPSULE, DELAYED RELEASE ORAL DAILY
COMMUNITY

## 2021-12-01 DIAGNOSIS — Z11.59 SCREENING FOR VIRAL DISEASE: Primary | ICD-10-CM

## 2021-12-07 ENCOUNTER — TELEPHONE (OUTPATIENT)
Dept: GASTROENTEROLOGY | Age: 71
End: 2021-12-07

## 2021-12-07 NOTE — TELEPHONE ENCOUNTER
Melvin Garsia called to reschedule CLN on 12/9 due to transporation. Please be advised that the best time to call her to accommodate their needs is Anytime. Thank you.

## 2022-01-10 ENCOUNTER — ANESTHESIA EVENT (OUTPATIENT)
Dept: OPERATING ROOM | Age: 72
End: 2022-01-10

## 2022-01-13 ENCOUNTER — HOSPITAL ENCOUNTER (OUTPATIENT)
Age: 72
Setting detail: OUTPATIENT SURGERY
Discharge: HOME | End: 2022-01-13
Attending: INTERNAL MEDICINE | Admitting: INTERNAL MEDICINE
Payer: MEDICARE

## 2022-01-13 ENCOUNTER — ANESTHESIA (OUTPATIENT)
Dept: OPERATING ROOM | Age: 72
End: 2022-01-13

## 2022-01-13 ENCOUNTER — HOSPITAL ENCOUNTER (OUTPATIENT)
Age: 72
Setting detail: SPECIMEN
Discharge: HOME | End: 2022-01-13
Payer: MEDICARE

## 2022-01-13 ENCOUNTER — APPOINTMENT (OUTPATIENT)
Dept: OPERATING ROOM | Age: 72
End: 2022-01-13

## 2022-01-13 VITALS
SYSTOLIC BLOOD PRESSURE: 143 MMHG | OXYGEN SATURATION: 100 % | DIASTOLIC BLOOD PRESSURE: 61 MMHG | WEIGHT: 199 LBS | RESPIRATION RATE: 20 BRPM | TEMPERATURE: 98.1 F | BODY MASS INDEX: 28.49 KG/M2 | HEART RATE: 57 BPM | HEIGHT: 70 IN

## 2022-01-13 VITALS — DIASTOLIC BLOOD PRESSURE: 68 MMHG | OXYGEN SATURATION: 99 % | SYSTOLIC BLOOD PRESSURE: 148 MMHG

## 2022-01-13 PROCEDURE — 45385 COLONOSCOPY W/LESION REMOVAL: CPT | Performed by: INTERNAL MEDICINE

## 2022-01-13 PROCEDURE — 88305 TISSUE EXAM BY PATHOLOGIST: CPT

## 2022-01-13 PROCEDURE — 45385 COLONOSCOPY W/LESION REMOVAL: CPT

## 2022-01-13 RX ORDER — LIDOCAINE HYDROCHLORIDE 10 MG/ML
INJECTION, SOLUTION INFILTRATION; PERINEURAL PRN
Status: DISCONTINUED | OUTPATIENT
Start: 2022-01-13 | End: 2022-01-13 | Stop reason: SDUPTHER

## 2022-01-13 RX ORDER — ONDANSETRON 2 MG/ML
INJECTION INTRAMUSCULAR; INTRAVENOUS PRN
Status: DISCONTINUED | OUTPATIENT
Start: 2022-01-13 | End: 2022-01-13 | Stop reason: SDUPTHER

## 2022-01-13 RX ORDER — PROPOFOL 10 MG/ML
INJECTION, EMULSION INTRAVENOUS PRN
Status: DISCONTINUED | OUTPATIENT
Start: 2022-01-13 | End: 2022-01-13 | Stop reason: SDUPTHER

## 2022-01-13 RX ORDER — SODIUM CHLORIDE 9 MG/ML
INJECTION, SOLUTION INTRAVENOUS CONTINUOUS
Status: DISCONTINUED | OUTPATIENT
Start: 2022-01-13 | End: 2022-01-13 | Stop reason: HOSPADM

## 2022-01-13 RX ORDER — ALLOPURINOL 100 MG/1
100 TABLET ORAL DAILY
COMMUNITY

## 2022-01-13 RX ADMIN — PROPOFOL 260 MG: 10 INJECTION, EMULSION INTRAVENOUS at 12:46

## 2022-01-13 RX ADMIN — LIDOCAINE HYDROCHLORIDE 40 MG: 10 INJECTION, SOLUTION INFILTRATION; PERINEURAL at 12:46

## 2022-01-13 RX ADMIN — ONDANSETRON 4 MG: 2 INJECTION INTRAMUSCULAR; INTRAVENOUS at 12:56

## 2022-01-13 RX ADMIN — SODIUM CHLORIDE: 9 INJECTION, SOLUTION INTRAVENOUS at 12:12

## 2022-01-13 ASSESSMENT — PAIN - FUNCTIONAL ASSESSMENT: PAIN_FUNCTIONAL_ASSESSMENT: 0-10

## 2022-01-13 NOTE — H&P
Patient Name: America Basilio  : 1950  MRN: [id number]  DATE: 22    Allergies: Allergies   Allergen Reactions    Latex     Ace Inhibitors     Amlodipine Besy-Benazepril Hcl     Aspirin     Cefdinir     Codeine     Demerol     Detrol [Tolterodine]     Pcn [Penicillins]     Sulfa Antibiotics         ENDOSCOPY  History and Physical    Procedure:    [] Diagnostic Colonoscopy       [x] Screening Colonoscopy  [] EGD      [] ERCP      [] EUS       [] Other    [x] Previous office notes/History and Physical reviewed from the patients chart. Please see EMR for further details of HPI. I have examined the patient's status immediately prior to the procedure and:      Indications/HPI:       [x] Screening              [x] History of Polyps      []Fhx of colon CA/polyps       Anesthesia:   [x] MAC [] Moderate Sedation   [] General   [] None     ROS: 12 pt review of systems was negative unless stated above    Medications:   Prior to Admission medications    Medication Sig Start Date End Date Taking?  Authorizing Provider   allopurinol (ZYLOPRIM) 100 MG tablet Take 100 mg by mouth daily   Yes Historical Provider, MD   albuterol sulfate  (90 Base) MCG/ACT inhaler Inhale 2 puffs into the lungs every 6 hours as needed for Wheezing   Yes Historical Provider, MD   DULoxetine (CYMBALTA) 60 MG extended release capsule Take 60 mg by mouth daily   Yes Historical Provider, MD   gemfibrozil (LOPID) 600 MG tablet Take 600 mg by mouth 2 times daily (before meals)   Yes Historical Provider, MD   acetaminophen (TYLENOL) 325 MG tablet Take 650 mg by mouth every 6 hours as needed for Pain   Yes Historical Provider, MD   Omega-3 Fatty Acids (FISH OIL PO) Take by mouth daily   Yes Historical Provider, MD   Cholecalciferol (VITAMIN D) 2000 UNITS CAPS capsule Take by mouth   Yes Historical Provider, MD   Ascorbic Acid (VITAMIN C) 250 MG tablet Take 250 mg by mouth daily   Yes Historical Provider, MD   FLUoxetine (PROZAC) 20 MG capsule Take 20 mg by mouth daily   Yes Historical Provider, MD   lovastatin (MEVACOR) 20 MG tablet Take 20 mg by mouth nightly   Yes Historical Provider, MD   pantoprazole (PROTONIX) 40 MG tablet Take 40 mg by mouth daily   Yes Historical Provider, MD   cetirizine (ZYRTEC) 10 MG tablet Take 10 mg by mouth daily   Yes Historical Provider, MD   carvedilol (COREG) 6.25 MG tablet Take 12.5 mg by mouth 2 times daily (with meals)    Yes Historical Provider, MD   furosemide (LASIX) 20 MG tablet Take 20 mg by mouth 2 times daily. Yes Historical Provider, MD   levothyroxine (SYNTHROID) 88 MCG tablet Take 88 mcg by mouth daily. Yes Historical Provider, MD   losartan (COZAAR) 100 MG tablet Take 100 mg by mouth daily. Yes Historical Provider, MD       Past Medical History:  Past Medical History:   Diagnosis Date    Allergic rhinitis     Anxiety     Chronic back pain     Colon polyp     Depression     GERD (gastroesophageal reflux disease)     Heart murmur     Hyperlipidemia     Hypertension     Hypothyroidism     Mitral valve prolapse     OTHER     Hx of Brain Stem encephalitis    Rheumatic fever     Shoulder pain, left     Thyroid disease     URI (upper respiratory infection)        Past Surgical History:  Past Surgical History:   Procedure Laterality Date    BLADDER SURGERY      tie up    CHOLECYSTECTOMY      COLONOSCOPY  8/2009    DR. BELL    COLONOSCOPY  12/19/2016    Dr Nazia Castorena bleeding small internal hemorrhoids-5 yr recall    HYSTERECTOMY      partial    MO COLONOSCOPY FLX DX W/COLLJ SPEC WHEN PFRMD N/A 12/19/2016    COLONOSCOPY DIAGNOSTIC OR SCREENING performed by Darien Marte MD at [facility] [place] [facility]    THYROIDECTOMY      one side    TONSILLECTOMY      UPPER GASTROINTESTINAL ENDOSCOPY  1/2002    DR. BELL       Social History:  Social History     Tobacco Use    Smoking status: Never Smoker    Smokeless tobacco: Not on file   Substance Use Topics    Alcohol use: No    Drug use: No       Vital Signs:   Vitals:    01/13/22 1155   BP: (!) 155/75   Pulse: 65   Resp: 16   Temp: 98 °F (36.7 °C)   SpO2: 100%        Physical Exam:  Cardiac:  [x]WNL  []Comments:  Pulmonary:  [x]WNL   []Comments:  Neuro/Mental Status:  [x]WNL  []Comments:  Abdominal:  [x]WNL    []Comments:  Other:   []WNL  []Comments:    Informed Consent:  The risks and benefits of the procedure have been discussed with either the patient or if they cannot consent, their representative. Assessment:  Patient examined and appropriate for planned sedation and procedure. Plan:  Proceed with planned sedation and procedure as above. Lui Louise am scribing for and in the presence of Dr. Jeferson Rayo MD.  Electronically signed by Orlin Kilpatrick RN on 1/13/2022 at 12:09 PM    I personally performed the services described in this documentation as scribed by Jodie Anderson, and it appears accurate and complete.      Jeferson Rayo MD  1/13/2022

## 2022-01-13 NOTE — ANESTHESIA POSTPROCEDURE EVALUATION
Department of Anesthesiology  Postprocedure Note    Patient: Victoriano Byrd  MRN: [id number]  YOB: 1950  Date of evaluation: 1/13/2022  Time:  1:15 PM     Procedure Summary     Date: 01/13/22 Room / Location: [facility] ENDO 01 / [address] [place] [address]    Anesthesia Start: 4159 Anesthesia Stop:     Procedure: [address], NOT HIGH RISK (N/A Abdomen) Diagnosis: (SCREENING/ HX CLN POLYPS-)    Surgeons: Iker Brandt MD Responsible Provider: MICHELE Riddle CRNA    Anesthesia Type: general, TIVA ASA Status: 3          Anesthesia Type: No value filed. Alden Phase I:      Alden Phase II:      Last vitals: Reviewed and per EMR flowsheets.        Anesthesia Post Evaluation    Patient location during evaluation: bedside  Patient participation: complete - patient participated  Level of consciousness: sleepy but conscious  Pain score: 0  Airway patency: patent  Nausea & Vomiting: no nausea and no vomiting  Complications: no  Cardiovascular status: hemodynamically stable and blood pressure returned to baseline  Respiratory status: acceptable  Hydration status: stable

## 2022-01-13 NOTE — PROGRESS NOTES
Abdominal pressure needed  in order to advance scope due to redundant and tortuous colon. Skin redness noted and shown to Dr. Marilynn Lord, will apply ointment post op.

## 2022-01-13 NOTE — ANESTHESIA PRE PROCEDURE
tablet Take 88 mcg by mouth daily. Yes Historical Provider, MD   losartan (COZAAR) 100 MG tablet Take 100 mg by mouth daily. Yes Historical Provider, MD       Current medications:    Current Facility-Administered Medications   Medication Dose Route Frequency Provider Last Rate Last Admin    0.9 % sodium chloride infusion   IntraVENous Continuous Natalia Benitez MD           Allergies: Allergies   Allergen Reactions    Latex     Ace Inhibitors     Amlodipine Besy-Benazepril Hcl     Aspirin     Cefdinir     Codeine     Demerol     Detrol [Tolterodine]     Pcn [Penicillins]     Sulfa Antibiotics        Problem List:    Patient Active Problem List   Diagnosis Code    Cholecystitis with cholelithiasis K80.10       Past Medical History:        Diagnosis Date    Allergic rhinitis     Anxiety     Chronic back pain     Colon polyp     Depression     GERD (gastroesophageal reflux disease)     Heart murmur     Hyperlipidemia     Hypertension     Hypothyroidism     Mitral valve prolapse     OTHER     Hx of Brain Stem encephalitis    Rheumatic fever     Shoulder pain, left     Thyroid disease     URI (upper respiratory infection)        Past Surgical History:        Procedure Laterality Date    BLADDER SURGERY      tie up    CHOLECYSTECTOMY      COLONOSCOPY  8/2009    DR. BELL    COLONOSCOPY  12/19/2016    Dr Parveen Bejarano bleeding small internal hemorrhoids-5 yr recall    HYSTERECTOMY      partial    AK COLONOSCOPY FLX DX W/COLLJ SPEC WHEN PFRMD N/A 12/19/2016    COLONOSCOPY DIAGNOSTIC OR SCREENING performed by Natalia Benitez MD at [address] [facility]    THYROIDECTOMY      one side    TONSILLECTOMY      UPPER GASTROINTESTINAL ENDOSCOPY  1/2002    DR. BELL       Social History:    Social History     Tobacco Use    Smoking status: Never Smoker    Smokeless tobacco: Not on file   Substance Use Topics    Alcohol use:  No                                Counseling given: Not Answered      Vital Signs (Current):   Vitals:    01/13/22 1155   BP: (!) 155/75   Pulse: 65   Resp: 16   Temp: 98 °F (36.7 °C)   TempSrc: Temporal   SpO2: 100%   Height: 5' 10\" (1.778 m)                                              BP Readings from Last 3 Encounters:   01/13/22 (!) 155/75   12/19/16 (!) 106/47   12/19/16 (!) 108/50       NPO Status:                                                                                 BMI:   Wt Readings from Last 3 Encounters:   12/19/16 227 lb (103 kg)   05/10/12 237 lb (107.5 kg)   03/26/12 237 lb (107.5 kg)     Body mass index is 32.57 kg/m². CBC:   Lab Results   Component Value Date    WBC 6.4 01/16/2019    RBC 4.11 01/16/2019    HGB 11.9 01/16/2019    HCT 37.3 01/16/2019    HCT 40.0 04/06/2012    MCV 90.8 01/16/2019    RDW 12.4 01/16/2019     01/16/2019     04/06/2012       CMP:   Lab Results   Component Value Date     01/16/2019     04/06/2012    K 5.2 01/16/2019    K 4.5 04/06/2012     01/16/2019     04/06/2012    CO2 24 01/16/2019    BUN 21 01/16/2019    CREATININE 1.3 01/16/2019    CREATININE 1.0 04/06/2012    LABGLOM 41 01/16/2019    GLUCOSE 102 01/16/2019    PROT 7.6 01/16/2019    PROT 8.1 04/06/2012    CALCIUM 9.2 01/16/2019    BILITOT 0.4 01/16/2019    ALKPHOS 134 01/16/2019    ALKPHOS 148 04/06/2012    AST 17 01/16/2019    ALT 10 01/16/2019       POC Tests: No results for input(s): POCGLU, POCNA, POCK, POCCL, POCBUN, POCHEMO, POCHCT in the last 72 hours.     Coags: No results found for: PROTIME, INR, APTT    HCG (If Applicable): No results found for: PREGTESTUR, PREGSERUM, HCG, HCGQUANT     ABGs: No results found for: PHART, PO2ART, UAJ2EXO, BOH7LLA, BEART, T9FQDUZJ     Type & Screen (If Applicable):  No results found for: LABABO, LABRH    Drug/Infectious Status (If Applicable):  No results found for: HIV, HEPCAB    COVID-19 Screening (If Applicable): No results found for: COVID19        Anesthesia Evaluation  Patient summary reviewed  Airway: Mallampati: II  TM distance: >3 FB   Neck ROM: full  Mouth opening: < 3 FB Dental: normal exam         Pulmonary:Negative Pulmonary ROS and normal exam                               Cardiovascular:    (+) hypertension:, valvular problems/murmurs (MVP):, hyperlipidemia         Beta Blocker:  Dose within 24 Hrs         Neuro/Psych:   (+) depression/anxiety              ROS comment: Chronic back pain GI/Hepatic/Renal:   (+) bowel prep,          ROS comment: BMI 32. Endo/Other:    (+) hypothyroidism::., .                 Abdominal:             Vascular: Other Findings:             Anesthesia Plan      general and TIVA     ASA 3       Induction: intravenous. Anesthetic plan and risks discussed with patient.                       MICHELE Fisher - CRNA   1/13/2022

## 2022-01-13 NOTE — OP NOTE
Patient: Ghulam Wyatt: 1950  [facility] [place] Rec#: [id number] Acc#: [id number]   Primary Care Provider Abhi Meyer MD    Date of Procedure:  1/13/2022    Endoscopist: Karen Corbin MD    Referring Provider: Abhi Meyer MD    Operation Performed: Colonoscopy with snare polypectomy    Indications: Screening- hx of polyps    Anesthesia:  Sedation was administered by anesthesia who monitored the patient during the procedure. I met with Di Jurado prior to procedure. We discussed the procedure itself, and I have discussed the risks of endoscopy (including-- but not limited to-- pain, discomfort, bleeding potentially requiring second endoscopic procedure and/or blood transfusion, organ perforation requiring operative repair, damage to organs near the colon, infection, aspiration, cardiopulmonary/allergic reaction), benefits, indications to endoscopy. Additionally, we discussed options other than colonoscopy. The patient expressed understanding. All questions answered. The patient decided to proceed with the procedure. Signed informed consent was placed on the chart. Blood Loss: minimal    Withdrawal time: > 6 min  Bowel Prep: adequate     Complications: no immediate complications    DESCRIPTION OF PROCEDURE:     A time out was performed. After written informed consent was obtained, the patient was placed in the left lateral position. The perianal area was inspected, and a digital rectal exam was performed. A rectal exam was performed: normal tone, no palpable lesions. At this point, a forward viewing Olympus colonoscope was inserted into the anus and carefully advanced to the cecum. The cecum was identified by the ileocecal valve and the appendiceal orifice. The colonoscope was then slowly withdrawn with careful inspection of the mucosa in a linear and circumferential fashion. The scope was retroflexed in the rectum.  Suction was utilized during the procedure to remove as much air as possible from the bowel. The colonoscope was removed from the patient, and the procedure was terminated. Findings are listed below. Findings: The mucosa appeared normal throughout the entire examined colon. In the ascending colon, there were two sessile polyps, approximately 4-6 mm each in size, which were removed completely with cold snare polypectomy. Retroflexion in the rectum was normal and revealed no further abnormalities. Recommendations:  1. Repeat colonoscopy: pending pathology - 5 years  2. Await biopsy results-you will receive a letter with your results. Findings and recommendations were discussed w/ the patient. A copy of the images was provided. Ramirez Mercedes am scribing for and in the presence of Dr. Tonia Mcmahon MD.  Electronically signed by Chelsea Garrison RN on 1/13/2022 at 12:10 PM    I personally performed the services described in this documentation as scribed by Lorenzo Cook, and it appears accurate and complete.      Tonia Mcmahon MD  1/13/2022

## 2022-01-13 NOTE — PROGRESS NOTES
Bactroban luis and gauze dressing applied to abd fold abrasions post-procedure per MD instructions. Luis and extra dressing supplies sent home with patient. 2-Day work excuse also given to patient per MD order.

## 2022-04-07 ENCOUNTER — HOSPITAL ENCOUNTER (EMERGENCY)
Facility: HOSPITAL | Age: 72
Discharge: HOME | End: 2022-04-07
Admitting: EMERGENCY MEDICINE

## 2022-04-07 ENCOUNTER — APPOINTMENT (OUTPATIENT)
Dept: GENERAL RADIOLOGY | Facility: HOSPITAL | Age: 72
End: 2022-04-07

## 2022-04-07 ENCOUNTER — APPOINTMENT (OUTPATIENT)
Dept: CT IMAGING | Facility: HOSPITAL | Age: 72
End: 2022-04-07

## 2022-04-07 VITALS
HEIGHT: 70 IN | WEIGHT: 196 LBS | SYSTOLIC BLOOD PRESSURE: 156 MMHG | RESPIRATION RATE: 18 BRPM | BODY MASS INDEX: 28.06 KG/M2 | DIASTOLIC BLOOD PRESSURE: 61 MMHG | OXYGEN SATURATION: 100 % | TEMPERATURE: 98.2 F | HEART RATE: 58 BPM

## 2022-04-07 DIAGNOSIS — S51.011A LACERATION OF RIGHT ELBOW, INITIAL ENCOUNTER: Primary | ICD-10-CM

## 2022-04-07 DIAGNOSIS — S09.90XA INJURY OF HEAD, INITIAL ENCOUNTER: ICD-10-CM

## 2022-04-07 PROCEDURE — 72125 CT NECK SPINE W/O DYE: CPT

## 2022-04-07 PROCEDURE — 0 LIDOCAINE 1 % SOLUTION: Performed by: NURSE PRACTITIONER

## 2022-04-07 PROCEDURE — 70450 CT HEAD/BRAIN W/O DYE: CPT

## 2022-04-07 PROCEDURE — 99283 EMERGENCY DEPT VISIT LOW MDM: CPT

## 2022-04-07 PROCEDURE — 73080 X-RAY EXAM OF ELBOW: CPT

## 2022-04-07 RX ORDER — CEPHALEXIN 500 MG/1
500 CAPSULE ORAL 4 TIMES DAILY
Qty: 28 CAPSULE | Refills: 0 | Status: SHIPPED | OUTPATIENT
Start: 2022-04-07 | End: 2022-04-14

## 2022-04-07 RX ORDER — LIDOCAINE HYDROCHLORIDE 10 MG/ML
10 INJECTION, SOLUTION INFILTRATION; PERINEURAL ONCE
Status: COMPLETED | OUTPATIENT
Start: 2022-04-07 | End: 2022-04-07

## 2022-04-07 RX ADMIN — LIDOCAINE HYDROCHLORIDE 10 ML: 10 INJECTION, SOLUTION INFILTRATION; PERINEURAL at 19:09

## 2022-04-07 NOTE — ED PROVIDER NOTES
Subjective   72  yof with PMH of hyperlipidemia, HTN, hypothyroidism, hypothyroidism and laryngopharyngeal reflux disease presents after a fall.  She states today she fell a short distance off of a ladder injuring her right elbow.  She states she bumped her head but denies LOC.  She denies neck pain.  She does have a c collar in place. She denies mid or low back pain.  She denies any other injury.  She denies weakness or dizziness. She denies use of blood thinning medication.            Review of Systems   Constitutional: Negative for activity change, appetite change, fatigue and fever.   HENT: Negative for congestion, ear pain, facial swelling and sore throat.    Eyes: Negative for discharge and visual disturbance.   Respiratory: Negative for apnea, chest tightness, shortness of breath, wheezing and stridor.    Cardiovascular: Negative for chest pain and palpitations.   Gastrointestinal: Negative for abdominal distention, abdominal pain, diarrhea, nausea and vomiting.   Genitourinary: Negative for difficulty urinating and dysuria.   Musculoskeletal: Negative for arthralgias and myalgias.   Skin: Positive for wound. Negative for rash.   Neurological: Negative for dizziness and seizures.   Psychiatric/Behavioral: Negative for agitation and confusion.       Past Medical History:   Diagnosis Date   • Chronic laryngitis 12/4/2017   • Chronic rhinitis 12/4/2017   • Depression    • Goiter 12/4/2017   • High cholesterol    • Hypertension    • Hypothyroidism    • LPRD (laryngopharyngeal reflux disease) 12/4/2017   • Postoperative hypothyroidism 12/4/2017       Allergies   Allergen Reactions   • Ace Inhibitors    • Amlodipine Besy-Benazepril Hcl    • Aspirin    • B12 Folate  [Folic Acid-Vit B6-Vit B12]    • Codeine    • Demerol [Meperidine]    • Detrol  [Tolterodine Tartrate]    • Latex    • Morphine And Related    • Penicillins    • Sulfa Antibiotics    • Zinc Hives and Itching       Past Surgical History:   Procedure  Laterality Date   • BLADDER SURGERY      sling   • HYSTERECTOMY     • THYROIDECTOMY, PARTIAL     • THYROIDECTOMY, PARTIAL Left    • TONSILLECTOMY     • TONSILLECTOMY         Family History   Problem Relation Age of Onset   • No Known Problems Father    • No Known Problems Mother    • Kidney cancer Sister    • Prostate cancer Brother        Social History     Socioeconomic History   • Marital status:    Tobacco Use   • Smoking status: Never Smoker   • Smokeless tobacco: Never Used   Substance and Sexual Activity   • Alcohol use: No   • Drug use: No           Objective   Physical Exam  Constitutional:       Appearance: She is well-developed.   HENT:      Head: Normocephalic.   Eyes:      Pupils: Pupils are equal, round, and reactive to light.   Cardiovascular:      Rate and Rhythm: Normal rate and regular rhythm.      Heart sounds: No murmur heard.  Pulmonary:      Effort: Pulmonary effort is normal.      Breath sounds: Normal breath sounds.   Abdominal:      General: Bowel sounds are normal.      Palpations: Abdomen is soft.   Musculoskeletal:         General: Normal range of motion.      Right elbow: No swelling or deformity. Normal range of motion. No tenderness.        Arms:       Cervical back: Normal range of motion and neck supple.      Comments: Large laceration present to the lateral aspect of the right elbow.  The RUE is pink, warm and dry with +PMS.  She has full ROM of the elbow.  C collar is in place.  No tenderness or deformity present to the thoracic or lumbar spine.    Skin:     General: Skin is warm and dry.   Neurological:      Mental Status: She is alert and oriented to person, place, and time.      GCS: GCS eye subscore is 4. GCS verbal subscore is 5. GCS motor subscore is 6.      Sensory: Sensation is intact.      Motor: No weakness.      Gait: Gait is intact.      Comments: She is neurologically intact         Laceration Repair    Date/Time: 4/7/2022 6:15 PM  Performed by: Shoulders,  THERESA Stokes  Authorized by: Farida Mares APRN     Consent:     Consent obtained:  Verbal    Consent given by:  Patient    Risks, benefits, and alternatives were discussed: yes      Risks discussed:  Infection, poor cosmetic result, poor wound healing, tendon damage and vascular damage    Alternatives discussed:  No treatment  Universal protocol:     Procedure explained and questions answered to patient or proxy's satisfaction: yes      Patient identity confirmed:  Verbally with patient  Anesthesia:     Anesthesia method:  Local infiltration    Local anesthetic:  Lidocaine 1% w/o epi  Laceration details:     Location:  Shoulder/arm    Shoulder/arm location:  R elbow    Length (cm):  7  Pre-procedure details:     Preparation:  Patient was prepped and draped in usual sterile fashion and imaging obtained to evaluate for foreign bodies  Exploration:     Limited defect created (wound extended): no      Imaging obtained: x-ray      Imaging outcome: foreign body not noted      Wound exploration: wound explored through full range of motion and entire depth of wound visualized      Wound extent: no muscle damage noted, no nerve damage noted, no tendon damage noted, no underlying fracture noted and no vascular damage noted      Contaminated: no    Treatment:     Area cleansed with:  Chlorhexidine    Amount of cleaning:  Extensive    Irrigation solution:  Sterile saline    Irrigation volume:  1000    Irrigation method:  Pressure wash    Visualized foreign bodies/material removed: no      Debridement:  None    Undermining:  None    Scar revision: no    Skin repair:     Repair method:  Sutures    Suture size:  4-0    Suture material:  Nylon    Suture technique:  Simple interrupted    Number of sutures:  12  Approximation:     Approximation:  Loose  Repair type:     Repair type:  Simple  Post-procedure details:     Dressing:  Antibiotic ointment and non-adherent dressing    Procedure completion:  Tolerated  well, no immediate complications               ED Course  ED Course as of 04/09/22 0019   Thu Apr 07, 2022   1840 Xray R elbow - IMPRESSION: 1. No acute bony abnormality is seen.  CT of the head - IMPRESSION: 1.  No acute intracranial findings. 2.  Chronic microvascular ischemic white matter change. 3.  Paranasal sinus mucosal thickening   CT of the cervical spine - IMPRESSION: 1.  No acute fracture. 2.  Trace anterolisthesis of C5 on C6, C6 on C7, and C7 on T1. This is likely related to degenerative change but is age indeterminate. 3.  Advanced multilevel facet arthropathy with multilevel neural foraminal stenosis. 4.  C2 and C3 vertebral bodies are fused.  Sutures placed.  I discussed her results with her.  She voiced understanding of all results and instructions.  [KS]      ED Course User Index  [KS] Farida Mares, APRN                                                 MDM  Number of Diagnoses or Management Options  Injury of head, initial encounter: minor  Laceration of right elbow, initial encounter: minor     Amount and/or Complexity of Data Reviewed  Tests in the radiology section of CPT®: ordered and reviewed    Risk of Complications, Morbidity, and/or Mortality  Presenting problems: minimal  Diagnostic procedures: minimal  Management options: minimal    Patient Progress  Patient progress: stable      Final diagnoses:   Laceration of right elbow, initial encounter   Injury of head, initial encounter       ED Disposition  ED Disposition     ED Disposition   Discharge    Condition   Stable    Comment   --             Austin Granados PA-C [address] [place] [address] [phone]    Schedule an appointment as soon as possible for a visit in 1 day  Routine ED follow up         Medication List      New Prescriptions    cephalexin 500 MG capsule  Commonly known as: KEFLEX  Take 1 capsule by mouth 4 (Four) Times a Day for 7 days.           Where to Get Your Medications      These medications  were sent to [facility], [place] - [address] - [phone]  - [phone]   [address] [facility] [address]    Phone: [phone]   · cephalexin 500 MG capsule          Vishnu, Farida Gutiérrez, APRN  04/09/22 0023     no

## 2022-04-07 NOTE — DISCHARGE INSTRUCTIONS
Drink plenty of fluid.  Medication as ordered.  Follow closed head injury instruction sheet. Wash area twice a day with antibacterial soap and apply antibiotic ointment and non stick dressing.  Sutures out in 10 days.  Follow up with PCP - call tomorrow for appointment. Return to ED if condition does not improve or worsens

## 2022-10-11 ENCOUNTER — OFFICE VISIT (OUTPATIENT)
Dept: UROLOGY | Facility: CLINIC | Age: 72
End: 2022-10-11

## 2022-10-11 VITALS — BODY MASS INDEX: 28.49 KG/M2 | TEMPERATURE: 96.8 F | HEIGHT: 70 IN | WEIGHT: 199 LBS

## 2022-10-11 DIAGNOSIS — N95.2 ATROPHIC VAGINITIS: Primary | ICD-10-CM

## 2022-10-11 LAB
BILIRUB BLD-MCNC: NEGATIVE MG/DL
CLARITY, POC: CLEAR
COLOR UR: YELLOW
GLUCOSE UR STRIP-MCNC: NEGATIVE MG/DL
KETONES UR QL: NEGATIVE
LEUKOCYTE EST, POC: NEGATIVE
NITRITE UR-MCNC: NEGATIVE MG/ML
PH UR: 5.5 [PH] (ref 5–8)
PROT UR STRIP-MCNC: NEGATIVE MG/DL
RBC # UR STRIP: NEGATIVE /UL
SP GR UR: 1.01 (ref 1–1.03)
UROBILINOGEN UR QL: NORMAL

## 2022-10-11 PROCEDURE — 99213 OFFICE O/P EST LOW 20 MIN: CPT | Performed by: UROLOGY

## 2022-10-11 PROCEDURE — 81003 URINALYSIS AUTO W/O SCOPE: CPT | Performed by: UROLOGY

## 2023-12-02 ENCOUNTER — NURSE TRIAGE (OUTPATIENT)
Dept: CALL CENTER | Facility: HOSPITAL | Age: 73
End: 2023-12-02
Payer: MEDICARE

## 2023-12-02 NOTE — TELEPHONE ENCOUNTER
"No fever. Sinus infection. Wants some antibiotics before it moves to her lungs. Says she usually takes doxycycline because she is allergic to penicillins. [facility] [address] easiest as she has a family member who works there who can bring it to her tonight.     Jaclyn Lafleur on call. Advised patient to go to [facility]. They do not call in antibiotics prior to patient being seen.     Patient updated via .     Reason for Disposition   [1] Prescription refill request for ESSENTIAL medicine (i.e., likelihood of harm to patient if not taken) AND [2] triager unable to refill per department policy    Additional Information   Negative: New-onset or worsening symptoms, see that guideline (e.g., diarrhea, runny nose, sore throat)   Negative: Medicine question not related to refill or renewal   Negative: Caller (e.g., patient or pharmacist) requesting information about a new medicine   Negative: Caller requesting information unrelated to medicine   Negative: [1] Prescription not at pharmacy AND [2] was prescribed by PCP recently  (Exception: Triager has access to EMR and prescription is recorded there. Go to Home Care and confirm for pharmacy.)    Answer Assessment - Initial Assessment Questions  1. DRUG NAME: \"What medicine do you need to have refilled?\"      No fever. Sinus infection. Wants some antibiotics before it moves to her lungs.  2. REFILLS REMAINING: \"How many refills are remaining?\" (Note: The label on the medicine or pill bottle will show how many refills are remaining. If there are no refills remaining, then a renewal may be needed.)      0  3. EXPIRATION DATE: \"What is the expiration date?\" (Note: The label states when the prescription will , and thus can no longer be refilled.)      0  4. PRESCRIBING HCP: \"Who prescribed it?\" Reason: If prescribed by specialist, call should be referred to that group.      Mustapha perez   5. SYMPTOMS: \"Do you have any symptoms?\"      Sinus infection   6. PREGNANCY: \"Is there any " "chance that you are pregnant?\" \"When was your last menstrual period?\"      No    Protocols used: Medication Refill and Renewal Call-ADULT-    "

## (undated) DEVICE — ENDO KIT,LOURDES HOSPITAL: Brand: MEDLINE INDUSTRIES, INC.